# Patient Record
Sex: FEMALE | Race: WHITE | NOT HISPANIC OR LATINO | Employment: UNEMPLOYED | URBAN - METROPOLITAN AREA
[De-identification: names, ages, dates, MRNs, and addresses within clinical notes are randomized per-mention and may not be internally consistent; named-entity substitution may affect disease eponyms.]

---

## 2020-07-22 ENCOUNTER — APPOINTMENT (EMERGENCY)
Dept: RADIOLOGY | Facility: HOSPITAL | Age: 55
DRG: 917 | End: 2020-07-22
Payer: COMMERCIAL

## 2020-07-22 ENCOUNTER — HOSPITAL ENCOUNTER (INPATIENT)
Facility: HOSPITAL | Age: 55
LOS: 5 days | DRG: 917 | End: 2020-07-27
Attending: EMERGENCY MEDICINE | Admitting: INTERNAL MEDICINE
Payer: COMMERCIAL

## 2020-07-22 DIAGNOSIS — R94.31 QT PROLONGATION: ICD-10-CM

## 2020-07-22 DIAGNOSIS — R03.0 ELEVATED BLOOD PRESSURE READING: ICD-10-CM

## 2020-07-22 DIAGNOSIS — T44.3X2A ANTICHOLINERGIC DRUG OVERDOSE, INTENTIONAL SELF-HARM, INITIAL ENCOUNTER (HCC): ICD-10-CM

## 2020-07-22 DIAGNOSIS — F10.10 ETOH ABUSE: ICD-10-CM

## 2020-07-22 DIAGNOSIS — J96.01 ACUTE RESPIRATORY FAILURE WITH HYPOXIA (HCC): ICD-10-CM

## 2020-07-22 DIAGNOSIS — T14.91XA SUICIDE ATTEMPT (HCC): ICD-10-CM

## 2020-07-22 DIAGNOSIS — R33.8 ACUTE URINARY RETENTION: ICD-10-CM

## 2020-07-22 DIAGNOSIS — T50.902A OVERDOSE, INTENTIONAL SELF-HARM, INITIAL ENCOUNTER (HCC): Primary | ICD-10-CM

## 2020-07-22 DIAGNOSIS — J96.91 RESPIRATORY FAILURE WITH HYPOXIA (HCC): ICD-10-CM

## 2020-07-22 DIAGNOSIS — F31.9 BIPOLAR DEPRESSION (HCC): ICD-10-CM

## 2020-07-22 DIAGNOSIS — F10.929 ALCOHOL INTOXICATION (HCC): ICD-10-CM

## 2020-07-22 DIAGNOSIS — F10.929 ALCOHOLIC INTOXICATION WITH COMPLICATION (HCC): ICD-10-CM

## 2020-07-22 LAB
ALBUMIN SERPL BCP-MCNC: 3.9 G/DL (ref 3.5–5)
ALP SERPL-CCNC: 94 U/L (ref 46–116)
ALT SERPL W P-5'-P-CCNC: 39 U/L (ref 12–78)
AMPHETAMINES SERPL QL SCN: NEGATIVE
ANION GAP SERPL CALCULATED.3IONS-SCNC: 12 MMOL/L (ref 4–13)
APAP SERPL-MCNC: <2 UG/ML (ref 10–20)
AST SERPL W P-5'-P-CCNC: 32 U/L (ref 5–45)
BARBITURATES UR QL: NEGATIVE
BASOPHILS # BLD AUTO: 0.04 THOUSANDS/ΜL (ref 0–0.1)
BASOPHILS NFR BLD AUTO: 1 % (ref 0–1)
BENZODIAZ UR QL: NEGATIVE
BILIRUB SERPL-MCNC: 0.2 MG/DL (ref 0.2–1)
BUN SERPL-MCNC: 22 MG/DL (ref 5–25)
CALCIUM SERPL-MCNC: 9 MG/DL (ref 8.3–10.1)
CHLORIDE SERPL-SCNC: 105 MMOL/L (ref 100–108)
CO2 SERPL-SCNC: 24 MMOL/L (ref 21–32)
COCAINE UR QL: NEGATIVE
CREAT SERPL-MCNC: 0.96 MG/DL (ref 0.6–1.3)
EOSINOPHIL # BLD AUTO: 0.37 THOUSAND/ΜL (ref 0–0.61)
EOSINOPHIL NFR BLD AUTO: 4 % (ref 0–6)
ERYTHROCYTE [DISTWIDTH] IN BLOOD BY AUTOMATED COUNT: 14.9 % (ref 11.6–15.1)
ETHANOL SERPL-MCNC: 218 MG/DL (ref 0–3)
GFR SERPL CREATININE-BSD FRML MDRD: 68 ML/MIN/1.73SQ M
GLUCOSE SERPL-MCNC: 83 MG/DL (ref 65–140)
HCT VFR BLD AUTO: 44.6 % (ref 34.8–46.1)
HGB BLD-MCNC: 15.1 G/DL (ref 11.5–15.4)
LYMPHOCYTES # BLD AUTO: 3.54 THOUSANDS/ΜL (ref 0.6–4.47)
LYMPHOCYTES NFR BLD AUTO: 42 % (ref 14–44)
MAGNESIUM SERPL-MCNC: 2.4 MG/DL (ref 1.6–2.6)
MCH RBC QN AUTO: 31 PG (ref 26.8–34.3)
MCHC RBC AUTO-ENTMCNC: 33.9 G/DL (ref 31.4–37.4)
MCV RBC AUTO: 92 FL (ref 82–98)
METHADONE UR QL: NEGATIVE
MONOCYTES # BLD AUTO: 0.71 THOUSAND/ΜL (ref 0.17–1.22)
MONOCYTES NFR BLD AUTO: 8 % (ref 4–12)
NEUTROPHILS # BLD AUTO: 3.79 THOUSANDS/ΜL (ref 1.85–7.62)
NEUTS SEG NFR BLD AUTO: 45 % (ref 43–75)
OPIATES UR QL SCN: NEGATIVE
OXYCODONE+OXYMORPHONE UR QL SCN: NEGATIVE
PCP UR QL: NEGATIVE
PLATELET # BLD AUTO: 268 THOUSANDS/UL (ref 149–390)
PMV BLD AUTO: 9.6 FL (ref 8.9–12.7)
POTASSIUM SERPL-SCNC: 4.3 MMOL/L (ref 3.5–5.3)
PROT SERPL-MCNC: 6.7 G/DL (ref 6.4–8.2)
RBC # BLD AUTO: 4.87 MILLION/UL (ref 3.81–5.12)
SALICYLATES SERPL-MCNC: 3.2 MG/DL (ref 3–20)
SARS-COV-2 RNA RESP QL NAA+PROBE: NEGATIVE
SODIUM SERPL-SCNC: 141 MMOL/L (ref 136–145)
THC UR QL: NEGATIVE
TROPONIN I SERPL-MCNC: <0.02 NG/ML
WBC # BLD AUTO: 8.45 THOUSAND/UL (ref 4.31–10.16)

## 2020-07-22 PROCEDURE — 99291 CRITICAL CARE FIRST HOUR: CPT | Performed by: EMERGENCY MEDICINE

## 2020-07-22 PROCEDURE — 87635 SARS-COV-2 COVID-19 AMP PRB: CPT | Performed by: EMERGENCY MEDICINE

## 2020-07-22 PROCEDURE — 83735 ASSAY OF MAGNESIUM: CPT | Performed by: EMERGENCY MEDICINE

## 2020-07-22 PROCEDURE — 80053 COMPREHEN METABOLIC PANEL: CPT | Performed by: EMERGENCY MEDICINE

## 2020-07-22 PROCEDURE — 94002 VENT MGMT INPAT INIT DAY: CPT

## 2020-07-22 PROCEDURE — 80320 DRUG SCREEN QUANTALCOHOLS: CPT | Performed by: EMERGENCY MEDICINE

## 2020-07-22 PROCEDURE — 94760 N-INVAS EAR/PLS OXIMETRY 1: CPT

## 2020-07-22 PROCEDURE — 0BH18EZ INSERTION OF ENDOTRACHEAL AIRWAY INTO TRACHEA, VIA NATURAL OR ARTIFICIAL OPENING ENDOSCOPIC: ICD-10-PCS | Performed by: EMERGENCY MEDICINE

## 2020-07-22 PROCEDURE — 71045 X-RAY EXAM CHEST 1 VIEW: CPT

## 2020-07-22 PROCEDURE — 80307 DRUG TEST PRSMV CHEM ANLYZR: CPT | Performed by: EMERGENCY MEDICINE

## 2020-07-22 PROCEDURE — 5A1935Z RESPIRATORY VENTILATION, LESS THAN 24 CONSECUTIVE HOURS: ICD-10-PCS | Performed by: EMERGENCY MEDICINE

## 2020-07-22 PROCEDURE — 84484 ASSAY OF TROPONIN QUANT: CPT | Performed by: EMERGENCY MEDICINE

## 2020-07-22 PROCEDURE — 96374 THER/PROPH/DIAG INJ IV PUSH: CPT

## 2020-07-22 PROCEDURE — 80329 ANALGESICS NON-OPIOID 1 OR 2: CPT | Performed by: EMERGENCY MEDICINE

## 2020-07-22 PROCEDURE — 36415 COLL VENOUS BLD VENIPUNCTURE: CPT | Performed by: EMERGENCY MEDICINE

## 2020-07-22 PROCEDURE — 85025 COMPLETE CBC W/AUTO DIFF WBC: CPT | Performed by: EMERGENCY MEDICINE

## 2020-07-22 PROCEDURE — 93005 ELECTROCARDIOGRAM TRACING: CPT

## 2020-07-22 PROCEDURE — 31500 INSERT EMERGENCY AIRWAY: CPT | Performed by: EMERGENCY MEDICINE

## 2020-07-22 PROCEDURE — 99291 CRITICAL CARE FIRST HOUR: CPT

## 2020-07-22 PROCEDURE — 96361 HYDRATE IV INFUSION ADD-ON: CPT

## 2020-07-22 PROCEDURE — 0T9B70Z DRAINAGE OF BLADDER WITH DRAINAGE DEVICE, VIA NATURAL OR ARTIFICIAL OPENING: ICD-10-PCS | Performed by: INTERNAL MEDICINE

## 2020-07-22 RX ORDER — SUMATRIPTAN 100 MG/1
100 TABLET, FILM COATED ORAL ONCE AS NEEDED
COMMUNITY

## 2020-07-22 RX ORDER — LORAZEPAM 2 MG/ML
2 INJECTION INTRAMUSCULAR ONCE
Status: COMPLETED | OUTPATIENT
Start: 2020-07-22 | End: 2020-07-22

## 2020-07-22 RX ORDER — FERROUS SULFATE 325(65) MG
325 TABLET ORAL 2 TIMES DAILY WITH MEALS
COMMUNITY

## 2020-07-22 RX ORDER — ETOMIDATE 2 MG/ML
20 INJECTION INTRAVENOUS ONCE
Status: COMPLETED | OUTPATIENT
Start: 2020-07-22 | End: 2020-07-22

## 2020-07-22 RX ORDER — LORAZEPAM 2 MG/ML
2 INJECTION INTRAMUSCULAR ONCE
Status: COMPLETED | OUTPATIENT
Start: 2020-07-23 | End: 2020-07-23

## 2020-07-22 RX ORDER — SENNA PLUS 8.6 MG/1
1 TABLET ORAL AS NEEDED
COMMUNITY

## 2020-07-22 RX ORDER — PROPOFOL 10 MG/ML
90 INJECTION, EMULSION INTRAVENOUS ONCE
Status: COMPLETED | OUTPATIENT
Start: 2020-07-23 | End: 2020-07-23

## 2020-07-22 RX ORDER — LORAZEPAM 1 MG/1
1 TABLET ORAL AS NEEDED
COMMUNITY
End: 2020-07-31 | Stop reason: HOSPADM

## 2020-07-22 RX ORDER — DULOXETIN HYDROCHLORIDE 60 MG/1
120 CAPSULE, DELAYED RELEASE ORAL DAILY
Status: ON HOLD | COMMUNITY
End: 2020-07-31 | Stop reason: SDUPTHER

## 2020-07-22 RX ORDER — LORAZEPAM 2 MG/ML
INJECTION INTRAMUSCULAR
Status: DISPENSED
Start: 2020-07-22 | End: 2020-07-23

## 2020-07-22 RX ORDER — VECURONIUM BROMIDE 1 MG/ML
10 INJECTION, POWDER, LYOPHILIZED, FOR SOLUTION INTRAVENOUS ONCE
Status: COMPLETED | OUTPATIENT
Start: 2020-07-22 | End: 2020-07-22

## 2020-07-22 RX ORDER — PROPOFOL 10 MG/ML
5-50 INJECTION, EMULSION INTRAVENOUS
Status: DISCONTINUED | OUTPATIENT
Start: 2020-07-23 | End: 2020-07-23

## 2020-07-22 RX ORDER — LAMOTRIGINE 150 MG/1
150 TABLET ORAL DAILY
COMMUNITY
End: 2020-07-31 | Stop reason: HOSPADM

## 2020-07-22 RX ORDER — CETIRIZINE HYDROCHLORIDE 10 MG/1
10 TABLET ORAL DAILY
COMMUNITY

## 2020-07-22 RX ORDER — PROPOFOL 10 MG/ML
INJECTION, EMULSION INTRAVENOUS
Status: COMPLETED
Start: 2020-07-22 | End: 2020-07-23

## 2020-07-22 RX ADMIN — ETOMIDATE 20 MG: 2 INJECTION, SOLUTION INTRAVENOUS at 23:20

## 2020-07-22 RX ADMIN — VECURONIUM BROMIDE 10 MG: 1 INJECTION, POWDER, LYOPHILIZED, FOR SOLUTION INTRAVENOUS at 23:20

## 2020-07-22 RX ADMIN — SODIUM CHLORIDE 1000 ML: 0.9 INJECTION, SOLUTION INTRAVENOUS at 22:15

## 2020-07-22 RX ADMIN — LORAZEPAM 2 MG: 2 INJECTION INTRAMUSCULAR; INTRAVENOUS at 23:20

## 2020-07-23 ENCOUNTER — APPOINTMENT (INPATIENT)
Dept: RADIOLOGY | Facility: HOSPITAL | Age: 55
DRG: 917 | End: 2020-07-23
Payer: COMMERCIAL

## 2020-07-23 PROBLEM — F10.10 ETOH ABUSE: Status: ACTIVE | Noted: 2020-07-23

## 2020-07-23 PROBLEM — F31.9 BIPOLAR DEPRESSION (HCC): Status: ACTIVE | Noted: 2020-07-23

## 2020-07-23 PROBLEM — E66.9 OBESE: Status: ACTIVE | Noted: 2020-07-23

## 2020-07-23 PROBLEM — T14.91XA SUICIDE ATTEMPT (HCC): Status: ACTIVE | Noted: 2020-07-23

## 2020-07-23 PROBLEM — J96.00 RESPIRATORY FAILURE, ACUTE (HCC): Status: ACTIVE | Noted: 2020-07-23

## 2020-07-23 PROBLEM — T44.3X1A ANTICHOLINERGIC DRUG OVERDOSE: Status: ACTIVE | Noted: 2020-07-23

## 2020-07-23 LAB
ANION GAP SERPL CALCULATED.3IONS-SCNC: 12 MMOL/L (ref 4–13)
ARTERIAL PATENCY WRIST A: YES
ATRIAL RATE: 106 BPM
BASE EXCESS BLDA CALC-SCNC: -4 MMOL/L
BASE EXCESS BLDA CALC-SCNC: -8.6 MMOL/L
BASOPHILS # BLD AUTO: 0.06 THOUSANDS/ΜL (ref 0–0.1)
BASOPHILS NFR BLD AUTO: 1 % (ref 0–1)
BODY TEMPERATURE: 99 DEGREES FEHRENHEIT
BODY TEMPERATURE: 99 DEGREES FEHRENHEIT
BUN SERPL-MCNC: 20 MG/DL (ref 5–25)
CALCIUM SERPL-MCNC: 7 MG/DL (ref 8.3–10.1)
CHLORIDE SERPL-SCNC: 106 MMOL/L (ref 100–108)
CO2 SERPL-SCNC: 21 MMOL/L (ref 21–32)
CREAT SERPL-MCNC: 0.7 MG/DL (ref 0.6–1.3)
EOSINOPHIL # BLD AUTO: 0.16 THOUSAND/ΜL (ref 0–0.61)
EOSINOPHIL NFR BLD AUTO: 2 % (ref 0–6)
ERYTHROCYTE [DISTWIDTH] IN BLOOD BY AUTOMATED COUNT: 14.6 % (ref 11.6–15.1)
GFR SERPL CREATININE-BSD FRML MDRD: 99 ML/MIN/1.73SQ M
GLUCOSE SERPL-MCNC: 105 MG/DL (ref 65–140)
HCO3 BLDA-SCNC: 19.2 MMOL/L (ref 22–28)
HCO3 BLDA-SCNC: 20 MMOL/L (ref 22–28)
HCT VFR BLD AUTO: 40.5 % (ref 34.8–46.1)
HGB BLD-MCNC: 13.7 G/DL (ref 11.5–15.4)
HOROWITZ INDEX BLDA+IHG-RTO: 30 MM[HG]
HOROWITZ INDEX BLDA+IHG-RTO: 80 MM[HG]
IMM GRANULOCYTES # BLD AUTO: 0.03 THOUSAND/UL (ref 0–0.2)
IMM GRANULOCYTES NFR BLD AUTO: 0 % (ref 0–2)
LYMPHOCYTES # BLD AUTO: 2.93 THOUSANDS/ΜL (ref 0.6–4.47)
LYMPHOCYTES NFR BLD AUTO: 34 % (ref 14–44)
MAGNESIUM SERPL-MCNC: 1.9 MG/DL (ref 1.6–2.6)
MCH RBC QN AUTO: 31.1 PG (ref 26.8–34.3)
MCHC RBC AUTO-ENTMCNC: 33.8 G/DL (ref 31.4–37.4)
MCV RBC AUTO: 92 FL (ref 82–98)
MONOCYTES # BLD AUTO: 0.65 THOUSAND/ΜL (ref 0.17–1.22)
MONOCYTES NFR BLD AUTO: 7 % (ref 4–12)
NEUTROPHILS # BLD AUTO: 4.9 THOUSANDS/ΜL (ref 1.85–7.62)
NEUTS SEG NFR BLD AUTO: 56 % (ref 43–75)
NRBC BLD AUTO-RTO: 0 /100 WBCS
O2 CT BLDA-SCNC: 18.3 ML/DL (ref 16–23)
O2 CT BLDA-SCNC: 18.9 ML/DL (ref 16–23)
OXYHGB MFR BLDA: 90.3 % (ref 94–97)
OXYHGB MFR BLDA: 94.6 % (ref 94–97)
P AXIS: 44 DEGREES
PCO2 BLDA: 30.3 MM HG (ref 36–44)
PCO2 BLDA: 53.4 MM HG (ref 36–44)
PCO2 TEMP ADJ BLDA: 30.6 MM HG (ref 36–44)
PCO2 TEMP ADJ BLDA: 53.9 MM HG (ref 36–44)
PEEP RESPIRATORY: 5 CM[H2O]
PEEP RESPIRATORY: 8 CM[H2O]
PH BLD: 7.19 [PH] (ref 7.35–7.45)
PH BLD: 7.42 [PH] (ref 7.35–7.45)
PH BLDA: 7.19 [PH] (ref 7.35–7.45)
PH BLDA: 7.42 [PH] (ref 7.35–7.45)
PHOSPHATE SERPL-MCNC: 2.9 MG/DL (ref 2.7–4.5)
PLATELET # BLD AUTO: 225 THOUSANDS/UL (ref 149–390)
PMV BLD AUTO: 9.6 FL (ref 8.9–12.7)
PO2 BLD: 84.2 MM HG (ref 75–129)
PO2 BLD: 84.8 MM HG (ref 75–129)
PO2 BLDA: 83.1 MM HG (ref 75–129)
PO2 BLDA: 83.7 MM HG (ref 75–129)
POTASSIUM SERPL-SCNC: 3.9 MMOL/L (ref 3.5–5.3)
PR INTERVAL: 154 MS
QRS AXIS: 27 DEGREES
QRSD INTERVAL: 142 MS
QT INTERVAL: 384 MS
QTC INTERVAL: 510 MS
RBC # BLD AUTO: 4.4 MILLION/UL (ref 3.81–5.12)
SODIUM SERPL-SCNC: 139 MMOL/L (ref 136–145)
SPECIMEN SOURCE: ABNORMAL
SPECIMEN SOURCE: ABNORMAL
T WAVE AXIS: 111 DEGREES
TROPONIN I SERPL-MCNC: <0.02 NG/ML
VENT AC: 16
VENT AC: 22
VENT- AC: AC
VENT- AC: AC
VENTRICULAR RATE: 106 BPM
VT SETTING VENT: 450 ML
VT SETTING VENT: 450 ML
WBC # BLD AUTO: 8.73 THOUSAND/UL (ref 4.31–10.16)

## 2020-07-23 PROCEDURE — 87081 CULTURE SCREEN ONLY: CPT | Performed by: INTERNAL MEDICINE

## 2020-07-23 PROCEDURE — 84100 ASSAY OF PHOSPHORUS: CPT | Performed by: PHYSICIAN ASSISTANT

## 2020-07-23 PROCEDURE — 94150 VITAL CAPACITY TEST: CPT

## 2020-07-23 PROCEDURE — 93010 ELECTROCARDIOGRAM REPORT: CPT | Performed by: INTERNAL MEDICINE

## 2020-07-23 PROCEDURE — 99292 CRITICAL CARE ADDL 30 MIN: CPT | Performed by: INTERNAL MEDICINE

## 2020-07-23 PROCEDURE — 99291 CRITICAL CARE FIRST HOUR: CPT | Performed by: EMERGENCY MEDICINE

## 2020-07-23 PROCEDURE — 80175 DRUG SCREEN QUAN LAMOTRIGINE: CPT | Performed by: EMERGENCY MEDICINE

## 2020-07-23 PROCEDURE — 82805 BLOOD GASES W/O2 SATURATION: CPT | Performed by: EMERGENCY MEDICINE

## 2020-07-23 PROCEDURE — 99291 CRITICAL CARE FIRST HOUR: CPT | Performed by: PHYSICIAN ASSISTANT

## 2020-07-23 PROCEDURE — 84484 ASSAY OF TROPONIN QUANT: CPT | Performed by: STUDENT IN AN ORGANIZED HEALTH CARE EDUCATION/TRAINING PROGRAM

## 2020-07-23 PROCEDURE — 31500 INSERT EMERGENCY AIRWAY: CPT

## 2020-07-23 PROCEDURE — 94760 N-INVAS EAR/PLS OXIMETRY 1: CPT

## 2020-07-23 PROCEDURE — 80048 BASIC METABOLIC PNL TOTAL CA: CPT | Performed by: PHYSICIAN ASSISTANT

## 2020-07-23 PROCEDURE — 94003 VENT MGMT INPAT SUBQ DAY: CPT

## 2020-07-23 PROCEDURE — 36600 WITHDRAWAL OF ARTERIAL BLOOD: CPT

## 2020-07-23 PROCEDURE — 94762 N-INVAS EAR/PLS OXIMTRY CONT: CPT

## 2020-07-23 PROCEDURE — 83735 ASSAY OF MAGNESIUM: CPT | Performed by: PHYSICIAN ASSISTANT

## 2020-07-23 PROCEDURE — 71045 X-RAY EXAM CHEST 1 VIEW: CPT

## 2020-07-23 PROCEDURE — 82805 BLOOD GASES W/O2 SATURATION: CPT | Performed by: NURSE PRACTITIONER

## 2020-07-23 PROCEDURE — 85025 COMPLETE CBC W/AUTO DIFF WBC: CPT | Performed by: PHYSICIAN ASSISTANT

## 2020-07-23 PROCEDURE — 36415 COLL VENOUS BLD VENIPUNCTURE: CPT | Performed by: EMERGENCY MEDICINE

## 2020-07-23 PROCEDURE — 93005 ELECTROCARDIOGRAM TRACING: CPT

## 2020-07-23 RX ORDER — LORAZEPAM 2 MG/ML
2 INJECTION INTRAMUSCULAR EVERY 2 HOUR PRN
Status: DISCONTINUED | OUTPATIENT
Start: 2020-07-23 | End: 2020-07-23

## 2020-07-23 RX ORDER — FOLIC ACID 1 MG/1
1 TABLET ORAL DAILY
Status: DISCONTINUED | OUTPATIENT
Start: 2020-07-23 | End: 2020-07-27 | Stop reason: HOSPADM

## 2020-07-23 RX ORDER — THIAMINE MONONITRATE (VIT B1) 100 MG
100 TABLET ORAL DAILY
Status: DISCONTINUED | OUTPATIENT
Start: 2020-07-23 | End: 2020-07-27 | Stop reason: HOSPADM

## 2020-07-23 RX ORDER — MAGNESIUM SULFATE HEPTAHYDRATE 40 MG/ML
2 INJECTION, SOLUTION INTRAVENOUS ONCE
Status: COMPLETED | OUTPATIENT
Start: 2020-07-23 | End: 2020-07-23

## 2020-07-23 RX ORDER — CHLORHEXIDINE GLUCONATE 0.12 MG/ML
15 RINSE ORAL EVERY 12 HOURS SCHEDULED
Status: DISCONTINUED | OUTPATIENT
Start: 2020-07-23 | End: 2020-07-24

## 2020-07-23 RX ORDER — SODIUM CHLORIDE, SODIUM GLUCONATE, SODIUM ACETATE, POTASSIUM CHLORIDE, MAGNESIUM CHLORIDE, SODIUM PHOSPHATE, DIBASIC, AND POTASSIUM PHOSPHATE .53; .5; .37; .037; .03; .012; .00082 G/100ML; G/100ML; G/100ML; G/100ML; G/100ML; G/100ML; G/100ML
100 INJECTION, SOLUTION INTRAVENOUS CONTINUOUS
Status: DISCONTINUED | OUTPATIENT
Start: 2020-07-23 | End: 2020-07-23

## 2020-07-23 RX ADMIN — PROPOFOL 50 MCG/KG/MIN: 10 INJECTION, EMULSION INTRAVENOUS at 11:06

## 2020-07-23 RX ADMIN — FOLIC ACID 1 MG: 1 TABLET ORAL at 09:07

## 2020-07-23 RX ADMIN — CHLORHEXIDINE GLUCONATE 0.12% ORAL RINSE 15 ML: 1.2 LIQUID ORAL at 08:02

## 2020-07-23 RX ADMIN — SODIUM CHLORIDE, SODIUM GLUCONATE, SODIUM ACETATE, POTASSIUM CHLORIDE, MAGNESIUM CHLORIDE, SODIUM PHOSPHATE, DIBASIC, AND POTASSIUM PHOSPHATE 100 ML/HR: .53; .5; .37; .037; .03; .012; .00082 INJECTION, SOLUTION INTRAVENOUS at 01:36

## 2020-07-23 RX ADMIN — LORAZEPAM 2 MG: 2 INJECTION INTRAMUSCULAR; INTRAVENOUS at 01:35

## 2020-07-23 RX ADMIN — Medication 1 TABLET: at 09:07

## 2020-07-23 RX ADMIN — CHLORHEXIDINE GLUCONATE 0.12% ORAL RINSE 15 ML: 1.2 LIQUID ORAL at 01:35

## 2020-07-23 RX ADMIN — LORAZEPAM 2 MG: 2 INJECTION INTRAMUSCULAR; INTRAVENOUS at 09:04

## 2020-07-23 RX ADMIN — MAGNESIUM SULFATE HEPTAHYDRATE 2 G: 40 INJECTION, SOLUTION INTRAVENOUS at 10:31

## 2020-07-23 RX ADMIN — LORAZEPAM 2 MG: 2 INJECTION INTRAMUSCULAR; INTRAVENOUS at 11:11

## 2020-07-23 RX ADMIN — Medication 100 MG: at 09:07

## 2020-07-23 RX ADMIN — LORAZEPAM 2 MG: 2 INJECTION INTRAMUSCULAR; INTRAVENOUS at 14:03

## 2020-07-23 RX ADMIN — ENOXAPARIN SODIUM 40 MG: 40 INJECTION SUBCUTANEOUS at 09:06

## 2020-07-23 RX ADMIN — SODIUM CHLORIDE 2.5 MG/HR: 0.9 INJECTION, SOLUTION INTRAVENOUS at 15:14

## 2020-07-23 RX ADMIN — PROPOFOL 10 MCG/KG/MIN: 10 INJECTION, EMULSION INTRAVENOUS at 00:07

## 2020-07-23 RX ADMIN — PROPOFOL 50 MCG/KG/MIN: 10 INJECTION, EMULSION INTRAVENOUS at 03:07

## 2020-07-23 RX ADMIN — PROPOFOL 45 MCG/KG/MIN: 10 INJECTION, EMULSION INTRAVENOUS at 14:00

## 2020-07-23 RX ADMIN — SODIUM CHLORIDE, SODIUM GLUCONATE, SODIUM ACETATE, POTASSIUM CHLORIDE, MAGNESIUM CHLORIDE, SODIUM PHOSPHATE, DIBASIC, AND POTASSIUM PHOSPHATE 100 ML/HR: .53; .5; .37; .037; .03; .012; .00082 INJECTION, SOLUTION INTRAVENOUS at 10:53

## 2020-07-23 RX ADMIN — LAMOTRIGINE 150 MG: 100 TABLET ORAL at 09:07

## 2020-07-23 RX ADMIN — PROPOFOL 50 MCG/KG/MIN: 10 INJECTION, EMULSION INTRAVENOUS at 05:14

## 2020-07-23 RX ADMIN — LORAZEPAM 2 MG: 2 INJECTION INTRAMUSCULAR; INTRAVENOUS at 00:07

## 2020-07-23 RX ADMIN — SODIUM CHLORIDE 1000 ML: 0.9 INJECTION, SOLUTION INTRAVENOUS at 00:16

## 2020-07-23 RX ADMIN — PROPOFOL 90 MG: 10 INJECTION, EMULSION INTRAVENOUS at 00:08

## 2020-07-23 RX ADMIN — PROPOFOL 50 MCG/KG/MIN: 10 INJECTION, EMULSION INTRAVENOUS at 08:01

## 2020-07-23 NOTE — H&P
H&P- Narciso Otto 1965, 49 y o  female MRN: [id number]    Unit/Bed#: ED CT1 Encounter: [id number]    Primary Care Provider: No primary care provider on file  Date and time admitted to hospital: 7/22/2020  9:50 PM        * Anticholinergic drug overdose  Assessment & Plan  Attempted suicide after drinking a bottle of wine and ingesting at least 15 tabs of benadryl 25 mg  Brought in by private vehicle by    Agitated/altered on arrival   UDS negative   Tylenol level <2   Given 2 mg Ativan on arrival and intubated for airway protection shortly after   Continue propofol infusion for sedation    Continue 2mg Ativan prn for seizure activity   Given 2L Bolus in ED, continue Isolyte at 100ml/hr for light hydration, monitor strict I/O's    Respiratory failure, acute hypoxic/hypercapnic ([facility] [address] )  Assessment & Plan  Secondary to sedation medications required for anticholinergic drug overdose  Continue mechanical ventilation    Current settings AC/VC 22/450/80/8    Continue to wean support as tolerated    Keep spo2 >90%    Vent bundle ordered   Sedation:    Continue propofol    Ativan prn    ETOH abuse  Assessment & Plan  Per  patient is intermittently binge drinking, mainly wine but sometimes drinks hard liquor    Recently has been drinking for the last several days   No history of etoh withdrawal   CIWA protocol    Suicide attempt [facility])  Assessment & Plan  H/o suicide attempt approximately 10 years ago with etoh as well as tylenol   Required psych admission after being medically treated  Per  patients father had been putting pressure on family by threatening to withhold financial support unless they live up to expectations   Son at [place] [facility] had a mental break down over this last week causing him to remove himself from the program  This is what sparked the patients recent etoh binge ending with a call to the patients father stating "im going to kill myself", he then sent a text to the  who found her in her room with an empty benadryl container and a bottle of wine  Pill count ingested approximately 15-20 tabs of 25mg benadryl   Etoh 218 on arrival    Bipolar depression [facility])  Assessment & Plan  - Takes cymbalta at home, currently on hold  - Take Lamictal 150mg at home, continue        -------------------------------------------------------------------------------------------------------------  Chief Complaint: AMS    History of Present Illness   HX and PE limited by: Levester Mortimer is a 49 y o  female w/ pmh of bipolar depression and history of suicide attempt  Per  family has been under a lot of stress lately d/t eldest son enrolling at [employer] and having "mental break down" requiring him to leave  In addition to this the patients father had been threatening to withhold financial support unless expectations were met  Over the last three days the patient had started binge drinking wine again until she would pass out everynight  7/22 patient called and threatened father saying that he has caused her too much pain and she would kill herself  The patients father alerted her  who traveled home from visiting their son at [facility], finding her altered with an empty bottle of wine and bendadryl next to her  He called into the ED after EMS was taking to long and brought her in by private vehicle  The patient was given ativan 2mg, 2L IVF bolus, UDS negative, etoh 218 and ultimately required intubation for airway protection  History obtained from chart review and the patients    -------------------------------------------------------------------------------------------------------------  Dispo:  Admit to Critical Care     Code Status: Level 1 - Full Code  --------------------------------------------------------------------------------------------------------------  Review of Systems   Unable to perform ROS: Intubated       Review of systems was unable to be performed secondary to intubated    Physical Exam   Constitutional: She appears well-developed and well-nourished  No distress  HENT:   Head: Normocephalic and atraumatic  Eyes: Pupils are equal, round, and reactive to light  Conjunctivae are normal    Neck: Normal range of motion  Neck supple  Cardiovascular: Normal rate, regular rhythm, normal heart sounds and intact distal pulses  No murmur heard  Pulmonary/Chest: Effort normal and breath sounds normal  No respiratory distress  She has no wheezes  Abdominal: Soft  Bowel sounds are normal  She exhibits no distension  Genitourinary:   Genitourinary Comments: [place]     Musculoskeletal: She exhibits no edema  Neurological:   Sedated/intubated   Skin: Skin is warm and dry  Capillary refill takes less than 2 seconds  She is not diaphoretic  Nursing note and vitals reviewed  --------------------------------------------------------------------------------------------------------------  Vitals:   Vitals:    07/22/20 2345 07/23/20 0000 07/23/20 0015 07/23/20 0047   BP: (!) 240/144 162/85 170/95    Pulse: (!) 108 104 100    Resp: 22 15 15    Temp:       TempSrc:       SpO2: 90% 91% 95% 94%   Weight:         Temp  Min: 99 °F (37 2 °C)  Max: 99 °F (37 2 °C)  IBW: -92 5 kg     There is no height or weight on file to calculate BMI      Laboratory and Diagnostics:  Results from last 7 days   Lab Units 07/22/20  2211   WBC Thousand/uL 8 45   HEMOGLOBIN g/dL 15 1   HEMATOCRIT % 44 6   PLATELETS Thousands/uL 268   NEUTROS PCT % 45   MONOS PCT % 8     Results from last 7 days   Lab Units 07/22/20  2211   SODIUM mmol/L 141   POTASSIUM mmol/L 4 3   CHLORIDE mmol/L 105   CO2 mmol/L 24   ANION GAP mmol/L 12   BUN mg/dL 22   CREATININE mg/dL 0 96   CALCIUM mg/dL 9 0   GLUCOSE RANDOM mg/dL 83   ALT U/L 39   AST U/L 32   ALK PHOS U/L 94   ALBUMIN g/dL 3 9   TOTAL BILIRUBIN mg/dL 0 20     Results from last 7 days   Lab Units 07/22/20  2211   MAGNESIUM mg/dL 2 4           Results from last 7 days   Lab Units 07/22/20  2211   TROPONIN I ng/mL <0 02         ABG:  Results from last 7 days   Lab Units 07/23/20  0027   PH ART  7 191*   PCO2 ART mm Hg 53 4*   PO2 ART mm Hg 83 7   HCO3 ART mmol/L 20 0*   BASE EXC ART mmol/L -8 6   ABG SOURCE  Radial, Left     VBG:  Results from last 7 days   Lab Units 07/23/20  0027   ABG SOURCE  Radial, Left           Micro:        EKG: NSR  Imaging: I have personally reviewed pertinent reports  and I have personally reviewed pertinent films in PACS      Historical Information   Past Medical History:   Diagnosis Date    Depression     Psychiatric disorder      History reviewed  No pertinent surgical history  Social History   Social History     Substance and Sexual Activity   Alcohol Use Yes     Social History     Substance and Sexual Activity   Drug Use Not Currently     Social History     Tobacco Use   Smoking Status Current Every Day Smoker    Packs/day: 1 00   Smokeless Tobacco Never Used     Exercise History: ambulates without issue  Family History:   History reviewed  No pertinent family history  I have reviewed this patient's family history and commented on sigificant items within the HPI      Medications:  Current Facility-Administered Medications   Medication Dose Route Frequency    chlorhexidine (PERIDEX) 0 12 % oral rinse 15 mL  15 mL Swish & Spit Q12H [address]    enoxaparin (LOVENOX) subcutaneous injection 40 mg  40 mg Subcutaneous Daily    lamoTRIgine (LaMICtal) tablet 150 mg  150 mg Oral Daily    multi-electrolyte (PLASMALYTE-A/ISOLYTE-S PH 7 4) IV solution  100 mL/hr Intravenous Continuous    propofol (DIPRIVAN) 1000 mg in 100 mL infusion (premix)  5-50 mcg/kg/min Intravenous Titrated    sodium chloride 0 9 % bolus 1,000 mL  1,000 mL Intravenous Once     Home medications:  Prior to Admission Medications   Prescriptions Last Dose Informant Patient Reported? Taking?    DULoxetine (CYMBALTA) 60 mg delayed release capsule   Yes Yes   Sig: Take 120 mg by mouth daily   Doxylamine Succinate, Sleep, (UNISOM PO)   Yes Yes   Sig: Take by mouth as needed   LORazepam (ATIVAN) 1 mg tablet   Yes Yes   Sig: Take 1 mg by mouth as needed for anxiety   SUMAtriptan (IMITREX) 100 mg tablet   Yes Yes   Sig: Take 100 mg by mouth once as needed for migraine   cetirizine (ZyrTEC) 10 mg tablet   Yes Yes   Sig: Take 10 mg by mouth daily   ferrous sulfate 325 (65 Fe) mg tablet   Yes Yes   Sig: Take 325 mg by mouth 2 (two) times a day with meals   lamoTRIgine (LaMICtal) 150 MG tablet   Yes Yes   Sig: Take 150 mg by mouth daily   senna (SENOKOT) 8 6 MG tablet   Yes Yes   Sig: Take 1 tablet by mouth as needed for constipation      Facility-Administered Medications: None     Allergies: Allergies   Allergen Reactions    Penicillins        ------------------------------------------------------------------------------------------------------------  Advance Directive and Living Will:      Power of :    POLST:    ------------------------------------------------------------------------------------------------------------  Anticipated Length of Stay is > 2 midnights    Care Time Delivered:   Upon my evaluation, this patient had a high probability of imminent or life-threatening deterioration due to respiratory failure, which required my direct attention, intervention, and personal management  I have personally provided 60 minutes (00:00 to 01:00) of critical care time, exclusive of procedures, teaching, family meetings, and any prior time recorded by providers other than myself  Sonia Officer, JONO        Portions of the record may have been created with voice recognition software  Occasional wrong word or "sound a like" substitutions may have occurred due to the inherent limitations of voice recognition software    Read the chart carefully and recognize, using context, where substitutions have occurred

## 2020-07-23 NOTE — UTILIZATION REVIEW
Continued Stay Review    Date:   7/23/20                        Current Patient Class: inpatient    Current Level of Care: icu    Assessment/Plan: icu mechanical ventilation consult toxicology   etoh  Cont  Propofol ativan pr   htn start Nicardipine gtt  Suicide attempt will have 1:1 once extubated   Psych consult once extubated  Toxicology  History of encephalopathy, tachycardia, garbled speech, large urinary output on post insertion, and report of diphenhhydramine overdose are consistent with anticholinergic toxicity  Her QRS and QTc prolongation may occur with diphenhydramine, duloxetine or lamotrigine overdose as per known availability  Please continue supportive care, PRN benzodiazepines/propofol, electrolyte optimization and hydration  Anticholinergic toxidrome generally lasts 12 to 36 hours  Since her QRS has normalized, bicarbonate administration is not indicated  QTc has also normalized  Elevated blood pressure is also common with anticholinergic overdose, but her blood pressure readings seem more profound, especially without history of hypertension  Therefore, I would recommend CT imaging of brain, as well as neck (secondary to being found on ground), and blood pressure control  This markedly elevated blood pressure also raises concern for possible coingestion of her sumatriptan, or possibly her duloxetine  In the case of sumatriptan or other ergot alkaloid overdose, antihypertensives are indicated  Agree with nicardipine infusion  Nitroglycerin and phentolamine are additional considerations    Please monitor closely for any sign of hypertension-related end organ injury, including repeating a troponin and monitoring for digital ischemia       Pertinent Labs/Diagnostic Results:   Results from last 7 days   Lab Units 07/22/20  2235   SARS-COV-2  Negative     Results from last 7 days   Lab Units 07/23/20  0435 07/22/20  2211   WBC Thousand/uL 8 73 8 45   HEMOGLOBIN g/dL 13 7 15 1   HEMATOCRIT % 40 5 44 6   PLATELETS Thousands/uL 225 268   NEUTROS ABS Thousands/µL 4 90 3 79     Results from last 7 days   Lab Units 07/23/20  0435 07/22/20  2211   SODIUM mmol/L 139 141   POTASSIUM mmol/L 3 9 4 3   CHLORIDE mmol/L 106 105   CO2 mmol/L 21 24   ANION GAP mmol/L 12 12   BUN mg/dL 20 22   CREATININE mg/dL 0 70 0 96   EGFR ml/min/1 73sq m 99 68   CALCIUM mg/dL 7 0* 9 0   MAGNESIUM mg/dL 1 9 2 4   PHOSPHORUS mg/dL 2 9  --      Results from last 7 days   Lab Units 07/22/20  2211   AST U/L 32   ALT U/L 39   ALK PHOS U/L 94   TOTAL PROTEIN g/dL 6 7   ALBUMIN g/dL 3 9   TOTAL BILIRUBIN mg/dL 0 20     Results from last 7 days   Lab Units 07/23/20  0728 07/23/20  0027   PH ART  7 420 7 191*   PCO2 ART mm Hg 30 3* 53 4*   PO2 ART mm Hg 83 1 83 7   HCO3 ART mmol/L 19 2* 20 0*   BASE EXC ART mmol/L -4 0 -8 6   O2 CONTENT ART mL/dL 18 9 18 3   O2 HGB, ARTERIAL % 94 6 90 3*   ABG SOURCE  Radial, Left Radial, Left     Results from last 7 days   Lab Units 07/22/20  2211   TROPONIN I ng/mL <0 02     Results from last 7 days   Lab Units 07/22/20  2230   AMPH/METH  Negative   BARBITURATE UR  Negative   BENZODIAZEPINE UR  Negative   COCAINE UR  Negative   METHADONE URINE  Negative   OPIATE UR  Negative   PCP UR  Negative   THC UR  Negative     Results from last 7 days   Lab Units 07/22/20  2211   ETHANOL LVL mg/dL 218*   ACETAMINOPHEN LVL ug/mL <0 2*   SALICYLATE LVL mg/dL 3 2       Vital Signs:   07/23/20 1600  97 6 °F (36 4 °C)  100  23Abnormal   181/84Abnormal   120  98 %       07/23/20 1554            98 %       07/23/20 1545    89  25Abnormal   174/77Abnormal   119  99 %       07/23/20 1530    90  24Abnormal   189/81Abnormal   117  98 %       07/23/20 1500    86  25Abnormal   194/88Abnormal    126  98 %       BP: nicardipine gtt initiated at 07/23/20 1500   07/23/20 1400    88  37Abnormal   177/90Abnormal   126  98 %       07/23/20 1330    85  26Abnormal   168/89   122  96 %       BP: Nicardipine gtt ordered, on hold for now, Dr Delfina Chowdary aware at 07/23/20 1330       icu  Mechanical ventilation  Npo  mrsa cx  Neuro checks  Medications:   Scheduled Medications:    Medications:  chlorhexidine 15 mL Swish & Spit Q12H [address]   enoxaparin 40 mg Subcutaneous Daily   folic acid 1 mg Oral Daily   lamoTRIgine 150 mg Oral Daily   multivitamin-minerals 1 tablet Oral Daily   thiamine 100 mg Oral Daily     Continuous IV Infusions:    multi-electrolyte 100 mL/hr Intravenous Continuous   propofol 5-50 mcg/kg/min Intravenous Titrated     PRN Meds:    LORazepam 2 mg Intravenous Q2H PRN   [START ON 7/24/2020] pneumococcal 23-valent polysaccharide vaccine 0 5 mL Intramuscular Prior to discharge       Discharge Plan: tbd    Network Utilization Review Department  [email or web address]  ATTENTION: Please call with any questions or concerns to [phone] and carefully listen to the prompts so that you are directed to the right person  All voicemails are confidential   Mati Ann all requests for admission clinical reviews, approved or denied determinations and any other requests to dedicated fax number below belonging to the campus where the patient is receiving treatment   List of dedicated fax numbers for the Facilities:  [address] [place] [address] DENIALS (Administrative/Medical Necessity) [phone]   [address] (Maternity/NICU/Pediatrics) [phone]   Gabriela Mchugh [phone]   Eleni Sinclair [phone]   Matthew De Leon [phone]   [address] [facility]  [phone]   [address] [facility] [address] [facility] [phone]   [facility]  [phone]   [address] [facility], [facility]  [address] [address] [phone]

## 2020-07-23 NOTE — ED NOTES
Pt extremely lethargic, obtunded, speech garbled  Almost unintelligible, hard to keep awake,  with pt   States hx of overdose in past as well     Stuart Verde RN  07/22/20 0324

## 2020-07-23 NOTE — ED NOTES
Pt desaturating again  Unable to maintain a good O2 sat even with a non rebreather   Doctor aware and preparing for intubation     Betsy Harris RN  07/23/20 1900

## 2020-07-23 NOTE — RESPIRATORY THERAPY NOTE
Extubated to 2L NC with CED Berger at bedside   Clear breath sounds, no stridor, able to vocalize, tolerated well

## 2020-07-23 NOTE — ED NOTES
Pt desaturated down to 88%, pt is lethargic   Oxygen 3 liters NC applied and O2 sat up to 95%     Chino Omalley RN  07/22/20 9793

## 2020-07-23 NOTE — PLAN OF CARE
Problem: Potential for Falls  Goal: Patient will remain free of falls  Description  INTERVENTIONS:  - Assess patient frequently for physical needs  -  Identify cognitive and physical deficits and behaviors that affect risk of falls  -  [place] fall precautions as indicated by assessment   - Educate patient/family on patient safety including physical limitations  - Instruct patient to call for assistance with activity based on assessment  - Modify environment to reduce risk of injury  - Consider OT/PT consult to assist with strengthening/mobility  7/23/2020 0234 by Inez Grace RN  Outcome: Progressing  7/23/2020 0234 by Inez Grace RN  Outcome: Progressing     Problem: RESPIRATORY - ADULT  Goal: Achieves optimal ventilation and oxygenation  Description  INTERVENTIONS:  - Assess for changes in respiratory status  - Assess for changes in mentation and behavior  - Position to facilitate oxygenation and minimize respiratory effort  - Oxygen administered by appropriate delivery if ordered  - Initiate smoking cessation education as indicated  - Encourage broncho-pulmonary hygiene including cough, deep breathe, Incentive Spirometry  - Assess the need for suctioning and aspirate as needed  - Assess and instruct to report SOB or any respiratory difficulty  - Respiratory Therapy support as indicated  7/23/2020 0234 by Inez Grace RN  Outcome: Progressing  7/23/2020 0234 by Inez Grace RN  Outcome: Progressing     Problem: Nutrition/Hydration-ADULT  Goal: Nutrient/Hydration intake appropriate for improving, restoring or maintaining nutritional needs  Description  Monitor and assess patient's nutrition/hydration status for malnutrition  Collaborate with interdisciplinary team and initiate plan and interventions as ordered  Monitor patient's weight and dietary intake as ordered or per policy  Utilize nutrition screening tool and intervene as necessary   Determine patient's food preferences and provide high-protein, high-caloric foods as appropriate       INTERVENTIONS:  - Monitor oral intake, urinary output, labs, and treatment plans  - Assess nutrition and hydration status and recommend course of action  - Evaluate amount of meals eaten  - Assist patient with eating if necessary   - Allow adequate time for meals  - Recommend/ encourage appropriate diets, oral nutritional supplements, and vitamin/mineral supplements  - Order, calculate, and assess calorie counts as needed  - Recommend, monitor, and adjust tube feedings and TPN/PPN based on assessed needs  - Assess need for intravenous fluids  - Provide specific nutrition/hydration education as appropriate  - Include patient/family/caregiver in decisions related to nutrition  7/23/2020 0234 by America Rosado RN  Outcome: Progressing  7/23/2020 0234 by America Rosado RN  Outcome: Progressing     Problem: Prexisting or High Potential for Compromised Skin Integrity  Goal: Skin integrity is maintained or improved  Description  INTERVENTIONS:  - Identify patients at risk for skin breakdown  - Assess and monitor skin integrity  - Assess and monitor nutrition and hydration status  - Monitor labs   - Assess for incontinence   - Turn and reposition patient  - Assist with mobility/ambulation  - Relieve pressure over bony prominences  - Avoid friction and shearing  - Provide appropriate hygiene as needed including keeping skin clean and dry  - Evaluate need for skin moisturizer/barrier cream  - Collaborate with interdisciplinary team   - Patient/family teaching  - Consider wound care consult   7/23/2020 0234 by America Rosado RN  Outcome: Progressing  7/23/2020 0234 by America Rosado RN  Outcome: Progressing     Problem: SAFETY,RESTRAINT: NV/NON-SELF DESTRUCTIVE BEHAVIOR  Goal: Remains free of harm/injury (restraint for non violent/non self-detsructive behavior)  Description  INTERVENTIONS:  - Instruct patient/family regarding restraint use   - Assess and monitor physiologic and psychological status   - Provide interventions and comfort measures to meet assessed patient needs   - Identify and implement measures to help patient regain control  - Assess readiness for release of restraint   7/23/2020 0234 by Jarrett Short RN  Outcome: Progressing  7/23/2020 0234 by Jarrett Short RN  Outcome: Progressing  Goal: Returns to optimal restraint-free functioning  Description  INTERVENTIONS:  - Assess the patient's behavior and symptoms that indicate continued need for restraint  - Identify and implement measures to help patient regain control  - Assess readiness for release of restraint   7/23/2020 0234 by Jarrett Short RN  Outcome: Progressing  7/23/2020 0234 by Jarrett Short RN  Outcome: Progressing     Problem: SAFETY ADULT  Goal: Patient will remain free of falls  Description  INTERVENTIONS:  - Assess patient frequently for physical needs  -  Identify cognitive and physical deficits and behaviors that affect risk of falls    -  [place] fall precautions as indicated by assessment   - Educate patient/family on patient safety including physical limitations  - Instruct patient to call for assistance with activity based on assessment  - Modify environment to reduce risk of injury  - Consider OT/PT consult to assist with strengthening/mobility  7/23/2020 0234 by Jarrett Short RN  Outcome: Progressing  7/23/2020 0234 by Jarrett Short RN  Outcome: Progressing  Goal: Maintain or return to baseline ADL function  Description  INTERVENTIONS:  -  Assess patient's ability to carry out ADLs; assess patient's baseline for ADL function and identify physical deficits which impact ability to perform ADLs (bathing, care of mouth/teeth, toileting, grooming, dressing, etc )  - Assess/evaluate cause of self-care deficits   - Assess range of motion  - Assess patient's mobility; develop plan if impaired  - Assess patient's need for assistive devices and provide as appropriate  - Encourage maximum independence but intervene and supervise when necessary  - Involve family in performance of ADLs  - Assess for home care needs following discharge   - Consider OT consult to assist with ADL evaluation and planning for discharge  - Provide patient education as appropriate  7/23/2020 0234 by Phuong Sifuentes RN  Outcome: Progressing  7/23/2020 0234 by Phuong Sifuentes RN  Outcome: Progressing  Goal: Maintain or return mobility status to optimal level  Description  INTERVENTIONS:  - Assess patient's baseline mobility status (ambulation, transfers, stairs, etc )    - Identify cognitive and physical deficits and behaviors that affect mobility  - Identify mobility aids required to assist with transfers and/or ambulation (gait belt, sit-to-stand, lift, walker, cane, etc )  - [place] fall precautions as indicated by assessment  - Record patient progress and toleration of activity level on Mobility SBAR; progress patient to next Phase/Stage  - Instruct patient to call for assistance with activity based on assessment  - Consider rehabilitation consult to assist with strengthening/weightbearing, etc   7/23/2020 0234 by Phuong Sifuentes RN  Outcome: Progressing  7/23/2020 0234 by Phuong Sifuentes RN  Outcome: Progressing     Problem: DISCHARGE PLANNING  Goal: Discharge to home or other facility with appropriate resources  Description  INTERVENTIONS:  - Identify barriers to discharge w/patient and caregiver  - Arrange for needed discharge resources and transportation as appropriate  - Identify discharge learning needs (meds, wound care, etc )  - Arrange for interpretive services to assist at discharge as needed  - Refer to Case Management Department for coordinating discharge planning if the patient needs post-hospital services based on physician/advanced practitioner order or complex needs related to functional status, cognitive ability, or social support system  7/23/2020 0234 by Phuong Sifuentes RN  Outcome: Progressing  7/23/2020 0234 by Inez Grace RN  Outcome: Progressing     Problem: Knowledge Deficit  Goal: Patient/family/caregiver demonstrates understanding of disease process, treatment plan, medications, and discharge instructions  Description  Complete learning assessment and assess knowledge base    Interventions:  - Provide teaching at level of understanding  - Provide teaching via preferred learning methods  7/23/2020 0234 by Inez Grace RN  Outcome: Progressing  7/23/2020 0234 by Inez Grace RN  Outcome: Progressing     Problem: CARDIOVASCULAR - ADULT  Goal: Maintains optimal cardiac output and hemodynamic stability  Description  INTERVENTIONS:  - Monitor I/O, vital signs and rhythm  - Monitor for S/S and trends of decreased cardiac output  - Administer and titrate ordered vasoactive medications to optimize hemodynamic stability  - Assess quality of pulses, skin color and temperature  - Assess for signs of decreased coronary artery perfusion  - Instruct patient to report change in severity of symptoms  7/23/2020 0234 by Inez Grace RN  Outcome: Progressing  7/23/2020 0234 by Inez Grace RN  Outcome: Progressing  Goal: Absence of cardiac dysrhythmias or at baseline rhythm  Description  INTERVENTIONS:  - Continuous cardiac monitoring, vital signs, obtain 12 lead EKG if ordered  - Administer antiarrhythmic and heart rate control medications as ordered  - Monitor electrolytes and administer replacement therapy as ordered  7/23/2020 0234 by Inez Grace RN  Outcome: Progressing  7/23/2020 0234 by Inez Grace RN  Outcome: Progressing     Problem: GENITOURINARY - ADULT  Goal: Maintains or returns to baseline urinary function  Description  INTERVENTIONS:  - Assess urinary function  - Encourage oral fluids to ensure adequate hydration if ordered  - Administer IV fluids as ordered to ensure adequate hydration  - Administer ordered medications as needed  - Offer frequent toileting  - Follow urinary retention protocol if ordered  7/23/2020 0234 by Linda Montes RN  Outcome: Progressing  7/23/2020 0234 by Linda Montes RN  Outcome: Progressing  Goal: Urinary catheter remains patent  Description  INTERVENTIONS:  - Assess patency of urinary catheter  - If patient has a chronic post, consider changing catheter if non-functioning  - Follow guidelines for intermittent irrigation of non-functioning urinary catheter  7/23/2020 0234 by Linda Montes RN  Outcome: Progressing  7/23/2020 0234 by Linda Montes RN  Outcome: Progressing     Problem: METABOLIC, FLUID AND ELECTROLYTES - ADULT  Goal: Electrolytes maintained within normal limits  Description  INTERVENTIONS:  - Monitor labs and assess patient for signs and symptoms of electrolyte imbalances  - Administer electrolyte replacement as ordered  - Monitor response to electrolyte replacements, including repeat lab results as appropriate  - Instruct patient on fluid and nutrition as appropriate  7/23/2020 0234 by Linda Montes RN  Outcome: Progressing  7/23/2020 0234 by Linda Montes RN  Outcome: Progressing  Goal: Fluid balance maintained  Description  INTERVENTIONS:  - Monitor labs   - Monitor I/O and WT  - Instruct patient on fluid and nutrition as appropriate  - Assess for signs & symptoms of volume excess or deficit  7/23/2020 0234 by Linda Montes RN  Outcome: Progressing  7/23/2020 0234 by Linda Montes RN  Outcome: Progressing  Goal: Glucose maintained within target range  Description  INTERVENTIONS:  - Monitor Blood Glucose as ordered  - Assess for signs and symptoms of hyperglycemia and hypoglycemia  - Administer ordered medications to maintain glucose within target range  - Assess nutritional intake and initiate nutrition service referral as needed  7/23/2020 0234 by Linda Montes RN  Outcome: Progressing  7/23/2020 0234 by Linda Montes RN  Outcome: Progressing     Problem: SKIN/TISSUE INTEGRITY - ADULT  Goal: Skin integrity remains intact  Description  INTERVENTIONS  - Identify patients at risk for skin breakdown  - Assess and monitor skin integrity  - Assess and monitor nutrition and hydration status  - Monitor labs (i e  albumin)  - Assess for incontinence   - Turn and reposition patient  - Assist with mobility/ambulation  - Relieve pressure over bony prominences  - Avoid friction and shearing  - Provide appropriate hygiene as needed including keeping skin clean and dry  - Evaluate need for skin moisturizer/barrier cream  - Collaborate with interdisciplinary team (i e  Nutrition, Rehabilitation, etc )   - Patient/family teaching  7/23/2020 0234 by Marylou Hong RN  Outcome: Progressing  7/23/2020 0234 by Marylou Hong RN  Outcome: Progressing     Problem: MUSCULOSKELETAL - ADULT  Goal: Maintain or return mobility to safest level of function  Description  INTERVENTIONS:  - Assess patient's ability to carry out ADLs; assess patient's baseline for ADL function and identify physical deficits which impact ability to perform ADLs (bathing, care of mouth/teeth, toileting, grooming, dressing, etc )  - Assess/evaluate cause of self-care deficits   - Assess range of motion  - Assess patient's mobility  - Assess patient's need for assistive devices and provide as appropriate  - Encourage maximum independence but intervene and supervise when necessary  - Involve family in performance of ADLs  - Assess for home care needs following discharge   - Consider OT consult to assist with ADL evaluation and planning for discharge  - Provide patient education as appropriate  7/23/2020 0234 by Marylou Hong RN  Outcome: Progressing  7/23/2020 0234 by Marylou Hong RN  Outcome: Progressing  Goal: Maintain proper alignment of affected body part  Description  INTERVENTIONS:  - Support, maintain and protect limb and body alignment  - Provide patient/ family with appropriate education  7/23/2020 0234 by Marylou Hong RN  Outcome: Progressing  7/23/2020 0234 by Desiree Ya, RN  Outcome: Progressing

## 2020-07-23 NOTE — ED NOTES
Pt BP increasing and pt moving arms and legs  Diprivan being titrated to sedation         Semaj James RN  07/23/20 [id number]

## 2020-07-23 NOTE — ED NOTES
Pt   at bedside, repeatedly asking about wife why she is twitching, if she is going walk out of here  Have ad multiple conversations  Explaining all the care and what we are doing to monitor the patient  He has had to be repeatedly told  To stay in the room and place mask back on face   again asked what we are going to do   For her explained she may be  Intubated   If her breathing gets worse or she becomes more lethargic  Will continue to monitor and update her  as needed  He continues to pace in the room     Herminia Byrd, [address] [facility]  07/22/20 225 [facility] [address], RN  07/22/20 4070

## 2020-07-23 NOTE — ED PROVIDER NOTES
History  Chief Complaint   Patient presents with    Overdose - Intentional     told  she texted father and told him she was going to kill herself  having prob with son  took normal bedtime meds  has been drinking wine all day and told  she took about 18 benadryl     Pt in ER with her  after an intentional medication overdose  Per , she said that she took 18tabs of benadryl at approx 8p  Pt also has a hx of chronic alcohol abuse, and has been drinking all day today  Pt lethargic with slurred speech on initial eval, oriented x 2  She denies somatic complaints  Per , pt with a hx of a previous suicide attempt 10-15 yrs ago, at which time she overdosed on tylenol  History provided by:  Patient  History limited by:  Mental status change   used: No    Overdose - Intentional   Ingested substance:  Alcohol and prescription medication  Witnesses present: no    Called poison control: no    Incident location:  Home  Context: depression and suicide attempt    Risk factors: hx of suicide attempts        Prior to Admission Medications   Prescriptions Last Dose Informant Patient Reported? Taking?    DULoxetine (CYMBALTA) 60 mg delayed release capsule Unknown at Unknown time  Yes No   Sig: Take 120 mg by mouth daily   Doxylamine Succinate, Sleep, (UNISOM PO) Unknown at Unknown time  Yes No   Sig: Take by mouth as needed   LORazepam (ATIVAN) 1 mg tablet Unknown at Unknown time  Yes No   Sig: Take 1 mg by mouth as needed for anxiety   SUMAtriptan (IMITREX) 100 mg tablet Unknown at Unknown time  Yes No   Sig: Take 100 mg by mouth once as needed for migraine   cetirizine (ZyrTEC) 10 mg tablet Unknown at Unknown time  Yes No   Sig: Take 10 mg by mouth daily   ferrous sulfate 325 (65 Fe) mg tablet Unknown at Unknown time  Yes No   Sig: Take 325 mg by mouth 2 (two) times a day with meals   lamoTRIgine (LaMICtal) 150 MG tablet Unknown at Unknown time  Yes No   Sig: Take 150 mg by mouth daily   senna (SENOKOT) 8 6 MG tablet Unknown at Unknown time  Yes No   Sig: Take 1 tablet by mouth as needed for constipation      Facility-Administered Medications: None       Past Medical History:   Diagnosis Date    Depression     Psychiatric disorder        History reviewed  No pertinent surgical history  History reviewed  No pertinent family history  I have reviewed and agree with the history as documented  E-Cigarette/Vaping    E-Cigarette Use Current Every Day User      E-Cigarette/Vaping Substances     Social History     Tobacco Use    Smoking status: Current Every Day Smoker     Packs/day: 1 00    Smokeless tobacco: Never Used   Substance Use Topics    Alcohol use: Yes    Drug use: Not Currently       Review of Systems   Unable to perform ROS: Mental status change       Physical Exam  Physical Exam   Constitutional: She appears well-developed and well-nourished  HENT:   Head: Normocephalic and atraumatic  Eyes: Pupils are equal, round, and reactive to light  Conjunctivae and EOM are normal  Right eye exhibits no discharge  Left eye exhibits no discharge  Pupils 2mm and reactive on initial eval   Cardiovascular: Normal rate and regular rhythm  Pulmonary/Chest: Effort normal and breath sounds normal    Abdominal: Soft  Bowel sounds are normal    Neurological: GCS eye subscore is 3  GCS verbal subscore is 4  GCS motor subscore is 6  Skin: Skin is warm and dry  Psychiatric: Her speech is slurred  She is slowed  She expresses impulsivity and inappropriate judgment  She expresses suicidal ideation  Nursing note and vitals reviewed        Vital Signs  ED Triage Vitals   Temperature Pulse Respirations Blood Pressure SpO2   07/22/20 2218 07/22/20 2200 07/22/20 2200 07/22/20 2200 07/22/20 2200   99 °F (37 2 °C) (!) 106 (!) 25 (!) 185/90 98 %      Temp Source Heart Rate Source Patient Position - Orthostatic VS BP Location FiO2 (%)   07/22/20 2218 07/23/20 0130 07/23/20 0130 07/23/20 0130 --   Tympanic Monitor Lying Left arm       Pain Score       --                  Vitals:    07/23/20 0300 07/23/20 0400 07/23/20 0500 07/23/20 0600   BP: (!) 149/101 144/93 (!) 157/101 (!) 184/114   Pulse: 86 85 80 80   Patient Position - Orthostatic VS:             Visual Acuity  Visual Acuity      Most Recent Value   L Pupil Size (mm)  2   R Pupil Size (mm)  2   L Pupil Shape  Round   R Pupil Shape  Round          ED Medications  Medications   propofol (DIPRIVAN) 1000 mg in 100 mL infusion (premix) (50 mcg/kg/min × 98 9 kg Intravenous New Bag 7/23/20 0514)   chlorhexidine (PERIDEX) 0 12 % oral rinse 15 mL (15 mL Swish & Spit Given 7/23/20 0135)   enoxaparin (LOVENOX) subcutaneous injection 40 mg (has no administration in time range)   multi-electrolyte (PLASMALYTE-A/ISOLYTE-S PH 7 4) IV solution (100 mL/hr Intravenous New Bag 7/23/20 0136)   lamoTRIgine (LaMICtal) tablet 150 mg (has no administration in time range)   LORazepam (ATIVAN) injection 2 mg (2 mg Intravenous Given 7/23/20 0135)   thiamine (VITAMIN B1) tablet 100 mg (has no administration in time range)   folic acid (FOLVITE) tablet 1 mg (has no administration in time range)   multivitamin-minerals (CENTRUM) tablet 1 tablet (has no administration in time range)   sodium chloride 0 9 % bolus 1,000 mL (0 mL Intravenous Stopped 7/22/20 2311)   LORazepam (ATIVAN) injection 2 mg (2 mg Intravenous Given 7/22/20 2320)   etomidate (AMIDATE) 2 mg/mL injection 20 mg (20 mg Intravenous Given 7/22/20 2320)   vecuronium (NORCURON) injection 10 mg (10 mg Intravenous Given 7/22/20 2320)   LORazepam (ATIVAN) injection 2 mg (2 mg Intravenous Given 7/23/20 0007)   propofol (DIPRIVAN) 200 MG/20ML bolus injection 90 mg (90 mg Intravenous Given 7/23/20 0008)   sodium chloride 0 9 % bolus 1,000 mL (0 mL Intravenous Stopped 7/23/20 0123)       Diagnostic Studies  Results Reviewed     Procedure Component Value Units Date/Time    Basic metabolic panel [[id number]]  (Abnormal) Collected:  07/23/20 0435    Lab Status:  Final result Specimen:  Blood from Arm, Left Updated:  07/23/20 0507     Sodium 139 mmol/L      Potassium 3 9 mmol/L      Chloride 106 mmol/L      CO2 21 mmol/L      ANION GAP 12 mmol/L      BUN 20 mg/dL      Creatinine 0 70 mg/dL      Glucose 105 mg/dL      Calcium 7 0 mg/dL      eGFR 99 ml/min/1 73sq m     Narrative:       [employer] guidelines for Chronic Kidney Disease (CKD):     Stage 1 with normal or high GFR (GFR > 90 mL/min/1 73 square meters)    Stage 2 Mild CKD (GFR = 60-89 mL/min/1 73 square meters)    Stage 3A Moderate CKD (GFR = 45-59 mL/min/1 73 square meters)    Stage 3B Moderate CKD (GFR = 30-44 mL/min/1 73 square meters)    Stage 4 Severe CKD (GFR = 15-29 mL/min/1 73 square meters)    Stage 5 End Stage CKD (GFR <15 mL/min/1 73 square meters)  Note: GFR calculation is accurate only with a steady state creatinine    Magnesium [[id number]]  (Normal) Collected:  07/23/20 0435    Lab Status:  Final result Specimen:  Blood from Arm, Left Updated:  07/23/20 0507     Magnesium 1 9 mg/dL     Phosphorus [[id number]]  (Normal) Collected:  07/23/20 0435    Lab Status:  Final result Specimen:  Blood from Arm, Left Updated:  07/23/20 0507     Phosphorus 2 9 mg/dL     CBC and differential [[id number]] Collected:  07/23/20 0435    Lab Status:  Final result Specimen:  Blood from Arm, Left Updated:  07/23/20 0440     WBC 8 73 Thousand/uL      RBC 4 40 Million/uL      Hemoglobin 13 7 g/dL      Hematocrit 40 5 %      MCV 92 fL      MCH 31 1 pg      MCHC 33 8 g/dL      RDW 14 6 %      MPV 9 6 fL      Platelets 477 Thousands/uL      nRBC 0 /100 WBCs      Neutrophils Relative 56 %      Immat GRANS % 0 %      Lymphocytes Relative 34 %      Monocytes Relative 7 %      Eosinophils Relative 2 %      Basophils Relative 1 %      Neutrophils Absolute 4 90 Thousands/µL      Immature Grans Absolute 0 03 Thousand/uL      Lymphocytes Absolute 2 93 Thousands/µL Monocytes Absolute 0 65 Thousand/µL      Eosinophils Absolute 0 16 Thousand/µL      Basophils Absolute 0 06 Thousands/µL     Blood gas, arterial [[id number]]  (Abnormal) Collected:  07/23/20 0027    Lab Status:  Final result Specimen:  Blood, Arterial from Radial, Left Updated:  07/23/20 0040     pH, Arterial 7  [address] TC 7 188     pCO2, Arterial 53 4 mm Hg      PCO2 (TC) Arterial 53 9 mm Hg      pO2, Arterial 83 7 mm Hg      PO2 (TC) Arterial 84 8 mm Hg      HCO3, Arterial 20 0 mmol/L      Base Excess, Arterial -8 6 mmol/L      O2 Content, Arterial 18 3 mL/dL      O2 HGB,Arterial  90 3 %      SOURCE Radial, Left     ARACELY TEST Yes     Temperature 99 0 Degrees Fehrenheit      Vent Type- AC AC     AC Rate 16     Tidal Volume 450 ml      Inspired Air (FIO2) 80     PEEP 8    Lamotrigine level [[id number]] Collected:  07/23/20 0021    Lab Status: In process Specimen:  Blood from Arm, Right Updated:  07/23/20 0024    Novel Coronavirus (Covid-19),PCR [facility] [[id number]]  (Normal) Collected:  07/22/20 2235    Lab Status:  Final result Specimen:  Nares from Nose Updated:  07/22/20 2332     SARS-CoV-2 Negative    Narrative: The specimen collection materials, transport medium, and/or testing methodology utilized in the production of these test results have been proven to be reliable in a limited validation with an abbreviated program under the Emergency Utilization Authorization provided by the FDA  Testing reported as "Presumptive positive" will be confirmed with secondary testing with a reference laboratory to ensure result accuracy  Clinical caution and judgement should be used with the interpretation of these results with consideration of the clinical impression and other laboratory testing  Testing reported as "Positive" or "Negative" has been proven to be accurate according to standard laboratory validation requirements    All testing is performed with control materials showing appropriate reactivity at standard intervals  Rapid drug screen, urine [[id number]]  (Normal) Collected:  07/22/20 2230    Lab Status:  Final result Specimen:  Urine, Clean Catch Updated:  07/22/20 2250     Amph/Meth UR Negative     Barbiturate Ur Negative     Benzodiazepine Urine Negative     Cocaine Urine Negative     Methadone Urine Negative     Opiate Urine Negative     PCP Ur Negative     THC Urine Negative     Oxycodone Urine Negative    Narrative:       FOR MEDICAL PURPOSES ONLY  IF CONFIRMATION NEEDED PLEASE CONTACT THE LAB WITHIN 5 DAYS  Drug Screen Cutoff Levels:  AMPHETAMINE/METHAMPHETAMINES  1000 ng/mL  BARBITURATES     200 ng/mL  BENZODIAZEPINES     200 ng/mL  COCAINE      300 ng/mL  METHADONE      300 ng/mL  OPIATES      300 ng/mL  PHENCYCLIDINE     25 ng/mL  THC       50 ng/mL  OXYCODONE      100 ng/mL    Troponin I [[id number]]  (Normal) Collected:  07/22/20 2211    Lab Status:  Final result Specimen:  Blood from Arm, Right Updated:  07/22/20 2240     Troponin I <0 02 ng/mL     Acetaminophen level-If concentration is detectable, please discuss with medical  on call   [[id number]]  (Abnormal) Collected:  07/22/20 2211    Lab Status:  Final result Specimen:  Blood from Arm, Right Updated:  07/22/20 2238     Acetaminophen Level <2 0 ug/mL     Comprehensive metabolic panel [[id number]] Collected:  07/22/20 2211    Lab Status:  Final result Specimen:  Blood from Arm, Right Updated:  07/22/20 2235     Sodium 141 mmol/L      Potassium 4 3 mmol/L      Chloride 105 mmol/L      CO2 24 mmol/L      ANION GAP 12 mmol/L      BUN 22 mg/dL      Creatinine 0 96 mg/dL      Glucose 83 mg/dL      Calcium 9 0 mg/dL      AST 32 U/L      ALT 39 U/L      Alkaline Phosphatase 94 U/L      Total Protein 6 7 g/dL      Albumin 3 9 g/dL      Total Bilirubin 0 20 mg/dL      eGFR 68 ml/min/1 73sq m     Narrative:       [place] guidelines for Chronic Kidney Disease (CKD):     Stage 1 with normal or high GFR (GFR > 90 mL/min/1 73 square meters)    Stage 2 Mild CKD (GFR = 60-89 mL/min/1 73 square meters)    Stage 3A Moderate CKD (GFR = 45-59 mL/min/1 73 square meters)    Stage 3B Moderate CKD (GFR = 30-44 mL/min/1 73 square meters)    Stage 4 Severe CKD (GFR = 15-29 mL/min/1 73 square meters)    Stage 5 End Stage CKD (GFR <15 mL/min/1 73 square meters)  Note: GFR calculation is accurate only with a steady state creatinine    Magnesium [[id number]]  (Normal) Collected:  07/22/20 2211    Lab Status:  Final result Specimen:  Blood from Arm, Right Updated:  07/22/20 2235     Magnesium 2 4 mg/dL     Salicylate level [[id number]]  (Normal) Collected:  07/22/20 2211    Lab Status:  Final result Specimen:  Blood from Arm, Right Updated:  02/13/92 2614     Salicylate Lvl 3 2 mg/dL     Ethanol [[id number]]  (Abnormal) Collected:  07/22/20 2211    Lab Status:  Final result Specimen:  Blood from Arm, Right Updated:  07/22/20 2233     Ethanol Lvl 218 mg/dL     CBC and differential [[id number]]  (Normal) Collected:  07/22/20 2211    Lab Status:  Final result Specimen:  Blood from Arm, Right Updated:  07/22/20 2218     WBC 8 45 Thousand/uL      RBC 4 87 Million/uL      Hemoglobin 15 1 g/dL      Hematocrit 44 6 %      MCV 92 fL      MCH 31 0 pg      MCHC 33 9 g/dL      RDW 14 9 %      MPV 9 6 fL      Platelets 456 Thousands/uL      Neutrophils Relative 45 %      Lymphocytes Relative 42 %      Monocytes Relative 8 %      Eosinophils Relative 4 %      Basophils Relative 1 %      Neutrophils Absolute 3 79 Thousands/µL      Lymphocytes Absolute 3 54 Thousands/µL      Monocytes Absolute 0 71 Thousand/µL      Eosinophils Absolute 0 37 Thousand/µL      Basophils Absolute 0 04 Thousands/µL                  XR chest 1 view portable   ED Interpretation by Demetrio Sepulveda DO (07/23 0035)   ETT above césar  Final Result by Tiara Acuna MD (07/23 0815)      Questionable right middle lobe infiltrate  No pneumothorax or pleural effusion  Findings concur with the preliminary report by the referring clinician already in PACS and/or our electronic record EPIC  Workstation performed: [id number]         XR chest 1 view portable   Final Result by Alcon Lima DO (07/23 2282)      Low lung volumes  Moderate-sized patchy opacity suspected in the right mid and lower lung field which could represent infection, aspiration, and/or edema depending on the clinical setting  Lungs otherwise appear grossly clear  Other findings as above            Workstation performed: [id number]                    Procedures  ECG 12 Lead Documentation Only  Date/Time: 7/22/2020 10:12 PM  Performed by: Alberto Rodas DO  Authorized by: Alberto Rodas DO     Indications / Diagnosis:  Overdose  ECG reviewed by me, the ED Provider: yes    Patient location:  ED  Previous ECG:     Previous ECG:  Compared to current    Similarity:  Changes noted    Comparison to cardiac monitor: Yes    Interpretation:     Interpretation: abnormal    Rate:     ECG rate:  106bpm    ECG rate assessment: tachycardic    Rhythm:     Rhythm: sinus tachycardia    Ectopy:     Ectopy: none    QRS:     QRS axis:  Normal  Conduction:     Conduction: abnormal      Abnormal conduction: complete LBBB    CriticalCare Time  Performed by: Alberto Rodas DO  Authorized by: Alberto Rodas DO     Critical care provider statement:     Critical care time (minutes):  35    Critical care time was exclusive of:  Separately billable procedures and treating other patients and teaching time    Critical care was necessary to treat or prevent imminent or life-threatening deterioration of the following conditions:  Respiratory failure    Critical care was time spent personally by me on the following activities:  Blood draw for specimens, obtaining history from patient or surrogate, development of treatment plan with patient or surrogate, discussions with consultants, evaluation of patient's response to treatment, examination of patient, interpretation of cardiac output measurements, ordering and performing treatments and interventions, ordering and review of laboratory studies, ordering and review of radiographic studies, re-evaluation of patient's condition and ventilator management    I assumed direction of critical care for this patient from another provider in my specialty: no    Intubation  Date/Time: 7/22/2020 11:30 PM  Performed by: Prabhjot Wu DO  Authorized by: Prabhjot Wu DO     Patient location:  ED  Other Assisting Provider: No    Consent:     Consent obtained:  Emergent situation    Consent given by:  Spouse    Risks discussed:  Aspiration    Alternatives discussed:  No treatment  Universal protocol:     Site/side marked: yes      Immediately prior to procedure, a time out was called: yes      Patient identity confirmed:  Arm band  Pre-procedure details:     Patient status:  Altered mental status    Mallampati score:  2    Pretreatment medications:  Etomidate    Paralytics:  Vecuronium  Indications:     Indications for intubation: respiratory failure, airway protection and hypoxemia    Procedure details:     Preoxygenation:  Bag valve mask    CPR in progress: no      Intubation method:  Oral    Oral intubation technique:  Direct and glidescope    Laryngoscope blade: Mac 4    Tube size (mm):  7 5    Tube type:  Cuffed    Number of attempts:  2    Ventilation between attempts: yes      Cricoid pressure: yes      Tube visualized through cords: yes    Placement assessment:     ETT to lip:  23    ETT to teeth:  24    Tube secured with:  ETT cornejo    Breath sounds:  Equal    Placement verification: chest rise, condensation, CXR verification, direct visualization, equal breath sounds and tube exhalation      CXR findings:  ETT in proper place    Ventilator settings:  16, 450, 8 PEEP, 100%   Post-procedure details:     Patient tolerance of procedure:   Tolerated well, no immediate complications             ED Course  ED Course as of Jul 23 0720 Wed Jul 22, 2020   2212 Call to poison control  US AUDIT      Most Recent Value   Initial Alcohol Screen: US AUDIT-C    1  How often do you have a drink containing alcohol? 6 Filed at: 07/22/2020 2155   Audit-C Score  6 Filed at: 07/22/2020 2155                  SHELBI/DAST-10      Most Recent Value   How many times in the past year have you    Used an illegal drug or used a prescription medication for non-medical reasons? Never Filed at: 07/22/2020 2155                                MDM  Number of Diagnoses or Management Options  Alcohol intoxication ([place] [address] ): Overdose, intentional self-harm, initial encounter [facility]):   Respiratory failure with hypoxia [facility]):   Suicide attempt [facility]):   Diagnosis management comments: Pt more hypoxic, with rhonchi on reeval  Pt also more agitated, less responsive and difficult to redirect  Decision made to intubate patient, for airway protection          Amount and/or Complexity of Data Reviewed  Clinical lab tests: ordered and reviewed  Tests in the radiology section of CPT®: ordered and reviewed    Risk of Complications, Morbidity, and/or Mortality  Presenting problems: high  Diagnostic procedures: high  Management options: high    Patient Progress  Patient progress: improved        Disposition  Final diagnoses:   Overdose, intentional self-harm, initial encounter ([place] [address] )   Suicide attempt ([place] [address] )   Alcohol intoxication ([place] [address] )   Respiratory failure with hypoxia ([place] [address] )     Time reflects when diagnosis was documented in both MDM as applicable and the Disposition within this note     Time User Action Codes Description Comment    7/22/2020 11:57 PM Paulo CARUSO Add [T50 902A] Overdose, intentional self-harm, initial encounter ([place] [address] )     7/22/2020 11:57 PM Oyesanmi, Gl  [address] Suicide attempt ([place] [address] )     7/22/2020 11:57 PM Paulo Collazo Add [F10 929] Alcohol intoxication ([place] [address] ) 7/22/2020 11:57 PM Ronnie Conley Add [J96 91] Respiratory failure with hypoxia [facility])       ED Disposition     ED Disposition Condition Date/Time Comment    Admit Stable Wed Jul 22, 2020 11:57 PM Case was discussed with ROHAN Fernandez and the patient's admission status was agreed to be Admission Status: inpatient status to the service of Dr Devonte Iniguez   Follow-up Information    None         Current Discharge Medication List      CONTINUE these medications which have NOT CHANGED    Details   cetirizine (ZyrTEC) 10 mg tablet Take 10 mg by mouth daily      Doxylamine Succinate, Sleep, (UNISOM PO) Take by mouth as needed      DULoxetine (CYMBALTA) 60 mg delayed release capsule Take 120 mg by mouth daily      ferrous sulfate 325 (65 Fe) mg tablet Take 325 mg by mouth 2 (two) times a day with meals      lamoTRIgine (LaMICtal) 150 MG tablet Take 150 mg by mouth daily      LORazepam (ATIVAN) 1 mg tablet Take 1 mg by mouth as needed for anxiety      senna (SENOKOT) 8 6 MG tablet Take 1 tablet by mouth as needed for constipation      SUMAtriptan (IMITREX) 100 mg tablet Take 100 mg by mouth once as needed for migraine           No discharge procedures on file      PDMP Review     None          ED Provider  Electronically Signed by           Bee Horan DO  07/23/20 3560

## 2020-07-23 NOTE — PLAN OF CARE
Problem: Potential for Falls  Goal: Patient will remain free of falls  Description  INTERVENTIONS:  - Assess patient frequently for physical needs  -  Identify cognitive and physical deficits and behaviors that affect risk of falls  -  [place] fall precautions as indicated by assessment   - Educate patient/family on patient safety including physical limitations  - Instruct patient to call for assistance with activity based on assessment  - Modify environment to reduce risk of injury  - Consider OT/PT consult to assist with strengthening/mobility  Outcome: Progressing     Problem: RESPIRATORY - ADULT  Goal: Achieves optimal ventilation and oxygenation  Description  INTERVENTIONS:  - Assess for changes in respiratory status  - Assess for changes in mentation and behavior  - Position to facilitate oxygenation and minimize respiratory effort  - Oxygen administered by appropriate delivery if ordered  - Initiate smoking cessation education as indicated  - Encourage broncho-pulmonary hygiene including cough, deep breathe, Incentive Spirometry  - Assess the need for suctioning and aspirate as needed  - Assess and instruct to report SOB or any respiratory difficulty  - Respiratory Therapy support as indicated  Outcome: Progressing     Problem: Nutrition/Hydration-ADULT  Goal: Nutrient/Hydration intake appropriate for improving, restoring or maintaining nutritional needs  Description  Monitor and assess patient's nutrition/hydration status for malnutrition  Collaborate with interdisciplinary team and initiate plan and interventions as ordered  Monitor patient's weight and dietary intake as ordered or per policy  Utilize nutrition screening tool and intervene as necessary  Determine patient's food preferences and provide high-protein, high-caloric foods as appropriate       INTERVENTIONS:  - Monitor oral intake, urinary output, labs, and treatment plans  - Assess nutrition and hydration status and recommend course of action  - Evaluate amount of meals eaten  - Assist patient with eating if necessary   - Allow adequate time for meals  - Recommend/ encourage appropriate diets, oral nutritional supplements, and vitamin/mineral supplements  - Order, calculate, and assess calorie counts as needed  - Recommend, monitor, and adjust tube feedings and TPN/PPN based on assessed needs  - Assess need for intravenous fluids  - Provide specific nutrition/hydration education as appropriate  - Include patient/family/caregiver in decisions related to nutrition  Outcome: Progressing     Problem: Prexisting or High Potential for Compromised Skin Integrity  Goal: Skin integrity is maintained or improved  Description  INTERVENTIONS:  - Identify patients at risk for skin breakdown  - Assess and monitor skin integrity  - Assess and monitor nutrition and hydration status  - Monitor labs   - Assess for incontinence   - Turn and reposition patient  - Assist with mobility/ambulation  - Relieve pressure over bony prominences  - Avoid friction and shearing  - Provide appropriate hygiene as needed including keeping skin clean and dry  - Evaluate need for skin moisturizer/barrier cream  - Collaborate with interdisciplinary team   - Patient/family teaching  - Consider wound care consult   Outcome: Progressing     Problem: SAFETY,RESTRAINT: NV/NON-SELF DESTRUCTIVE BEHAVIOR  Goal: Remains free of harm/injury (restraint for non violent/non self-detsructive behavior)  Description  INTERVENTIONS:  - Instruct patient/family regarding restraint use   - Assess and monitor physiologic and psychological status   - Provide interventions and comfort measures to meet assessed patient needs   - Identify and implement measures to help patient regain control  - Assess readiness for release of restraint   Outcome: Progressing  Goal: Returns to optimal restraint-free functioning  Description  INTERVENTIONS:  - Assess the patient's behavior and symptoms that indicate continued need for restraint  - Identify and implement measures to help patient regain control  - Assess readiness for release of restraint   Outcome: Progressing     Problem: SAFETY ADULT  Goal: Patient will remain free of falls  Description  INTERVENTIONS:  - Assess patient frequently for physical needs  -  Identify cognitive and physical deficits and behaviors that affect risk of falls  -  [place] fall precautions as indicated by assessment   - Educate patient/family on patient safety including physical limitations  - Instruct patient to call for assistance with activity based on assessment  - Modify environment to reduce risk of injury  - Consider OT/PT consult to assist with strengthening/mobility  Outcome: Progressing  Goal: Maintain or return to baseline ADL function  Description  INTERVENTIONS:  - Assess patient frequently for physical needs  -  Identify cognitive and physical deficits and behaviors that affect risk of falls    -  [place] fall precautions as indicated by assessment   - Educate patient/family on patient safety including physical limitations  - Instruct patient to call for assistance with activity based on assessment  - Modify environment to reduce risk of injury  - Consider OT/PT consult to assist with strengthening/mobility  Outcome: Progressing  Goal: Maintain or return mobility status to optimal level  Description  INTERVENTIONS:  -  Assess patient's ability to carry out ADLs; assess patient's baseline for ADL function and identify physical deficits which impact ability to perform ADLs (bathing, care of mouth/teeth, toileting, grooming, dressing, etc )  - Assess/evaluate cause of self-care deficits   - Assess range of motion  - Assess patient's mobility; develop plan if impaired  - Assess patient's need for assistive devices and provide as appropriate  - Encourage maximum independence but intervene and supervise when necessary  - Involve family in performance of ADLs  - Assess for home care needs following discharge   - Consider OT consult to assist with ADL evaluation and planning for discharge  - Provide patient education as appropriate  Outcome: Progressing     Problem: DISCHARGE PLANNING  Goal: Discharge to home or other facility with appropriate resources  Description  INTERVENTIONS:  - Identify barriers to discharge w/patient and caregiver  - Arrange for needed discharge resources and transportation as appropriate  - Identify discharge learning needs (meds, wound care, etc )  - Arrange for interpretive services to assist at discharge as needed  - Refer to Case Management Department for coordinating discharge planning if the patient needs post-hospital services based on physician/advanced practitioner order or complex needs related to functional status, cognitive ability, or social support system  Outcome: Progressing     Problem: Knowledge Deficit  Goal: Patient/family/caregiver demonstrates understanding of disease process, treatment plan, medications, and discharge instructions  Description  Complete learning assessment and assess knowledge base    Interventions:  - Provide teaching at level of understanding  - Provide teaching via preferred learning methods  Outcome: Progressing     Problem: CARDIOVASCULAR - ADULT  Goal: Maintains optimal cardiac output and hemodynamic stability  Description  INTERVENTIONS:  - Monitor I/O, vital signs and rhythm  - Monitor for S/S and trends of decreased cardiac output  - Administer and titrate ordered vasoactive medications to optimize hemodynamic stability  - Assess quality of pulses, skin color and temperature  - Assess for signs of decreased coronary artery perfusion  - Instruct patient to report change in severity of symptoms  Outcome: Progressing  Goal: Absence of cardiac dysrhythmias or at baseline rhythm  Description  INTERVENTIONS:  - Continuous cardiac monitoring, vital signs, obtain 12 lead EKG if ordered  - Administer antiarrhythmic and heart rate control medications as ordered  - Monitor electrolytes and administer replacement therapy as ordered  Outcome: Progressing     Problem: GENITOURINARY - ADULT  Goal: Maintains or returns to baseline urinary function  Description  INTERVENTIONS:  - Assess urinary function  - Encourage oral fluids to ensure adequate hydration if ordered  - Administer IV fluids as ordered to ensure adequate hydration  - Administer ordered medications as needed  - Offer frequent toileting  - Follow urinary retention protocol if ordered  Outcome: Progressing  Goal: Urinary catheter remains patent  Description  INTERVENTIONS:  - Assess patency of urinary catheter  - If patient has a chronic post, consider changing catheter if non-functioning  - Follow guidelines for intermittent irrigation of non-functioning urinary catheter  Outcome: Progressing     Problem: METABOLIC, FLUID AND ELECTROLYTES - ADULT  Goal: Electrolytes maintained within normal limits  Description  INTERVENTIONS:  - Monitor labs and assess patient for signs and symptoms of electrolyte imbalances  - Administer electrolyte replacement as ordered  - Monitor response to electrolyte replacements, including repeat lab results as appropriate  - Instruct patient on fluid and nutrition as appropriate  Outcome: Progressing  Goal: Fluid balance maintained  Description  INTERVENTIONS:  - Monitor labs   - Monitor I/O and WT  - Instruct patient on fluid and nutrition as appropriate  - Assess for signs & symptoms of volume excess or deficit  Outcome: Progressing  Goal: Glucose maintained within target range  Description  INTERVENTIONS:  - Monitor Blood Glucose as ordered  - Assess for signs and symptoms of hyperglycemia and hypoglycemia  - Administer ordered medications to maintain glucose within target range  - Assess nutritional intake and initiate nutrition service referral as needed  Outcome: Progressing     Problem: SKIN/TISSUE INTEGRITY - ADULT  Goal: Skin integrity remains intact  Description  INTERVENTIONS  - Identify patients at risk for skin breakdown  - Assess and monitor skin integrity  - Assess and monitor nutrition and hydration status  - Monitor labs (i e  albumin)  - Assess for incontinence   - Turn and reposition patient  - Assist with mobility/ambulation  - Relieve pressure over bony prominences  - Avoid friction and shearing  - Provide appropriate hygiene as needed including keeping skin clean and dry  - Evaluate need for skin moisturizer/barrier cream  - Collaborate with interdisciplinary team (i e  Nutrition, Rehabilitation, etc )   - Patient/family teaching  Outcome: Progressing     Problem: MUSCULOSKELETAL - ADULT  Goal: Maintain or return mobility to safest level of function  Description  INTERVENTIONS:  - Assess patient's ability to carry out ADLs; assess patient's baseline for ADL function and identify physical deficits which impact ability to perform ADLs (bathing, care of mouth/teeth, toileting, grooming, dressing, etc )  - Assess/evaluate cause of self-care deficits   - Assess range of motion  - Assess patient's mobility  - Assess patient's need for assistive devices and provide as appropriate  - Encourage maximum independence but intervene and supervise when necessary  - Involve family in performance of ADLs  - Assess for home care needs following discharge   - Consider OT consult to assist with ADL evaluation and planning for discharge  - Provide patient education as appropriate  Outcome: Progressing  Goal: Maintain proper alignment of affected body part  Description  INTERVENTIONS:  - Support, maintain and protect limb and body alignment  - Provide patient/ family with appropriate education  Outcome: Progressing   Care plan ongoing

## 2020-07-23 NOTE — SOCIAL WORK
LOS - 1 day    SW following to assist as needed  Pt is currently in ICU  Intubated  Pt was admitted following overdose/suicide attempt  Per notes once pt it medically stable pt will be evaluated by psychiatrist for inpatient psychiatric care

## 2020-07-23 NOTE — CONSULTS
Consultation - Medical Toxicology  Heydi Covarrubias 49 y o  female MRN: [id number]  Unit/Bed#: ICU 01 Encounter: [id number]      Reason for Consult / Principal Problem: overdose  Inpatient consult to Toxicology  Consult performed by: Marilyn Ac DO  Consult ordered by: Je Whitmore DO        07/23/20      ASSESSMENT:  49year-old female with acute, intentional overdose  1  Acute intentional overdose  2  Anticholinergic toxicity   3  QRS prolongation  4  QTc prolongation  5  Elevated blood pressure  6  Encephalopathy  7  Respiratory failure   8  Possible aspiration   9  Ethanol intoxication       RECOMMENDATIONS:  Please continue supportive care, including cardiac and respiratory monitoring, IV fluids, and benzodiazepines PRN for agitation  History of encephalopathy, tachycardia, garbled speech, large urinary output on post insertion, and report of diphenhhydramine overdose are consistent with anticholinergic toxicity  Her QRS and QTc prolongation may occur with diphenhydramine, duloxetine or lamotrigine overdose as per known availability  Please continue supportive care, PRN benzodiazepines/propofol, electrolyte optimization and hydration  Anticholinergic toxidrome generally lasts 12 to 36 hours  Since her QRS has normalized, bicarbonate administration is not indicated  QTc has also normalized  Elevated blood pressure is also common with anticholinergic overdose, but her blood pressure readings seem more profound, especially without history of hypertension  Therefore, I would recommend consideration of CT imaging of brain, along with blood pressure control  This elevated blood pressure also raises concern for possible coingestion of her sumatriptan, or possibly her duloxetine  In the case of sumatriptan or other ergot alkaloid overdose, antihypertensives are indicated  Agree with nicardipine infusion  Nitroglycerin and phentolamine are additional considerations    Please monitor closely for any sign of hypertension-related end organ injury, including repeating a troponin and monitoring for digital ischemia  Additionally, please observe for signs of ethanol withdrawal      Patient will need psychiatric evaluation once stable  4:53 PM  At this point, I have evaluated the patient and I do suspect she primarily overdosed on diphenhydramine and was also significantly intoxicated with ethanol  With propofol turned off, patient is able to follow commands, answer yes/no questions by nodding and lift her head off pillow  Given her improvement, I recommend working to extubation  Return of mild tachycardia while off of propofol is consistent with recent anticholinergic overdose, and some degree of hypertension is anticipated  Please continue routine management of her blood pressure, assure no neuro deficits once extubated and no longer sedated  For further questions, please call [facility]  Service or Patient Access Center to reach the medical  on call  Please see additional teaching note below (if available)    Medical Toxicology  [address] [facility]  Anticholinergic Syndrome  Updated 03/04/2008    a- Background: Anticholinergic intoxication can occur from exposure to a wide variety of prescription and over-the-counter medications and numerous plants and mushrooms  Common drugs that have anticholinergic activities include: antihistamines, antipsychotics, antispasmodics, skeletal muscle relaxants, and tricyclic antidepressants (TCAs)  Plants and mushrooms containing anticholinergic alkaloids include: jimsonweed (Datura stramonium), deadly nightshade (Atropa belladonna), and fly agaric (Robert Gill)  b- Mechanism of Toxicity:   Competitively antagonize the effect of acetylcholine at peripheral muscarinic receptors  They mostly affect exocrine glands (such as sweating and salivation) and smooth muscle   Inhibition of muscarinic activities in the heart leads to a rapid heartbeat   Pharmacokinetics: Absorption may be delayed due to its effect on GI motility  Duration of toxic effect can be quite prolonged (e g  benztropine  2-3 days)  c- Toxic dose: The range of toxicity is highly variable and unpredictable   Examples of Fatal doses from case reports:  i  Young Child: 1-2 mg Atropine, instilled in the eye  ii  Adult: 32 mg Atropine, IM injection   Minimal effect: increased heart rate and dry mouth after 360 mg of trospium chloride  d- Clinical presentation: Anticholinergic syndrome is characterized by warm, dry, flushed skin, dry mucous membranes, mydriasis, delirium, tachycardia, ileus, and urinary retention  Jerky myoclonic movements and choreoathetosis are common and can lead to rhabdomyolysis  Hyperthermia, coma, respiratory arrest and seizures may occur  e- Diagnosis: Based on history of exposure and typical features of anticholinergic syndrome  Trial dose of physostigmine can be used to confirm the diagnosis, especially with rapid reversal of the syndrome  f- Laboratory studies: Not generally available for the anticholinergics, but other labs can be useful such as electrolytes, glucose, CPK, and ECG  g- Treatment:    ABC   Seizure and muscular hyperactivity: Should be treated with benzodiazepines  Treat aggressively to prevent hyperthermia and rhabdomyolysis and their resultant complications  If unsuccessful use phenobarbital or propofol   Delirium: Treat with benzodiazepines, if resistant to benzodiazepines consider treating with physostigmine   GI decontamination maybe helpful in delayed presentation secondary to decreased GI motility provided that the patient is awake and alert   Enhanced elimination: Procedures such as hemodialysis and repeated-dose activated charcoal are not effective in removing anticholinergic agents   Physostigmine:   i  Pharmacology: Physostigmine is a carbamate and a reversible inhibitor of acetylcholinesterase   It increases acetylcholine concentration, causing stimulation of both muscarinic and nicotinic receptors  It also can penetrate the blood-brain barrier  It has nonspecific arousal effects  1  Onset of action: 3-8 minutes after parenteral administration  2  Duration of action: 30-90 minutes  3  Elimination half-life: 15-40 minutes  ii  Indications:  1  Severe anticholinergic syndrome from antimuscarinic agents  Generally used to reverse delirium resistant to benzodiazepines  2  Diagnostically: To differentiate functional psychosis from anticholinergic delirium   iii  Contraindications:  1  Should not be used as an antidote for cyclic antidepressant overdose because it may worsen cardiac conduction disturbance, cause bradyarrythmias or asystole, and aggravate or precipitate seizures  2  Do not use physostigmine with concurrent use of depolarizing neuromuscular blockers (e g  succinylcholine)  3  Known hypersensitivity to agent or preservative  4  Relative contraindication may include: Asthma, peripheral vascular disease, intestinal and bladder blockade, parkinsonian syndrome, and AV Block  iv  Adverse effects:  1  Bradycardia, heart block, and asystole  2  Seizure (particularly with rapid administration or excessive dose)  3  Nausea, vomiting, hypersalivation, and diarrhea  4  Bronchorrhea and bronchospasm  5  Fasciculation and muscle weakness  v  Dosage:  1  Adult: 0 5-2 mg slow IV push (£ 1 mg/min)  2  Children: 0 02 mg/kg slow IV push (£ 0 5 mg/min)  3  Repeat as needed every 10-30 minutes  4  Precautions: Patient should be on a cardiac monitor  Atropine should be kept at bedside to treat excessive muscarinic stimulation  5  Should not be administered IM or as a continuous infusion  6  It is better to undertreat than to overtreat  Physostigmine toxicity results when used in excess or in the absence of antimuscarinic toxicity  References:  Meseret Carlos  Poisoning & Drug Overdose  2007  [facility]  [place] Toxicologic Emergencies  2006  Hx and PE limited by: intubation     HPI: Sherita Powell is a 49y o  year old female who presents with reported overdose on diphenhydramine  She was found confused at home by relative and arrived via EMS  She was reportedly alert and oriented x2 in the ED but then decompensated with depressed mental status, requiring intubation   She had evidence of respiratory acidosis and anticholinergic toxicity  She was persistently hypertensive overnight despite adequate sedation  Upon  Myc evaluation, her mental status was improving  I discussed with ICU team and patient's daughter at bedside  Daughter confirms that suspected overdose was on diphenhydramine, and that she also at least took her nightly lamotrigine and lorazepam        Review of Systems   Unable to perform ROS: Intubated   Psychiatric/Behavioral: Positive for self-injury  Historical Information   Past Medical History:   Diagnosis Date    Depression     Psychiatric disorder      History reviewed  No pertinent surgical history  Social History   Social History     Substance and Sexual Activity   Alcohol Use Yes     Social History     Substance and Sexual Activity   Drug Use Not Currently     Social History     Tobacco Use   Smoking Status Current Every Day Smoker    Packs/day: 1 00   Smokeless Tobacco Never Used     History reviewed  No pertinent family history  Prior to Admission medications    Medication Sig Start Date End Date Taking?  Authorizing Provider   cetirizine (ZyrTEC) 10 mg tablet Take 10 mg by mouth daily    Historical Provider, MD   Doxylamine Succinate, Sleep, (UNISOM PO) Take by mouth as needed    Historical Provider, MD   DULoxetine (CYMBALTA) 60 mg delayed release capsule Take 120 mg by mouth daily    Historical Provider, MD   ferrous sulfate 325 (65 Fe) mg tablet Take 325 mg by mouth 2 (two) times a day with meals    Historical Provider, MD   lamoTRIgine (LaMICtal) 150 MG tablet Take 150 mg by mouth daily    Historical Provider, MD   LORazepam (ATIVAN) 1 mg tablet Take 1 mg by mouth as needed for anxiety    Historical Provider, MD   senna (SENOKOT) 8 6 MG tablet Take 1 tablet by mouth as needed for constipation    Historical Provider, MD   SUMAtriptan (IMITREX) 100 mg tablet Take 100 mg by mouth once as needed for migraine    Historical Provider, MD       Current Facility-Administered Medications   Medication Dose Route Frequency    chlorhexidine (PERIDEX) 0 12 % oral rinse 15 mL  15 mL Swish & Spit Q12H [address]    enoxaparin (LOVENOX) subcutaneous injection 40 mg  40 mg Subcutaneous Daily    folic acid (FOLVITE) tablet 1 mg  1 mg Oral Daily    lamoTRIgine (LaMICtal) tablet 150 mg  150 mg Oral Daily    LORazepam (ATIVAN) injection 2 mg  2 mg Intravenous Q2H PRN    multi-electrolyte (PLASMALYTE-A/ISOLYTE-S PH 7 4) IV solution  100 mL/hr Intravenous Continuous    multivitamin-minerals (CENTRUM) tablet 1 tablet  1 tablet Oral Daily    niCARdipine (CARDENE) 25 mg (STANDARD CONCENTRATION) in sodium chloride 0 9% 250 mL  1-15 mg/hr Intravenous Titrated    [START ON 7/24/2020] pneumococcal 23-valent polysaccharide vaccine (PNEUMOVAX-23) injection 0 5 mL  0 5 mL Intramuscular Prior to discharge    propofol (DIPRIVAN) 1000 mg in 100 mL infusion (premix)  5-50 mcg/kg/min Intravenous Titrated    thiamine (VITAMIN B1) tablet 100 mg  100 mg Oral Daily       Allergies   Allergen Reactions    Penicillins        Objective       Intake/Output Summary (Last 24 hours) at 7/23/2020 1653  Last data filed at 7/23/2020 1620  Gross per 24 hour   Intake 794 63 ml   Output 2425 ml   Net -1630 37 ml       Invasive Devices:   Peripheral IV 07/22/20 Right Forearm (Active)   Site Assessment Clean;Dry; Intact 7/23/2020 12:00 PM   Dressing Type Transparent 7/23/2020 12:00 PM   Line Status Infusing 7/23/2020 12:00 PM   Dressing Status Clean;Dry; Intact 7/23/2020 12:00 PM   Dressing Change Due 07/26/20 7/23/2020  4:00 AM   Reason Not Rotated Not due 7/23/2020 12:00 PM       Peripheral IV 07/22/20 Right Wrist (Active)   Site Assessment Clean;Dry; Intact 7/23/2020 12:00 PM   Dressing Type Transparent 7/23/2020 12:00 PM   Line Status Infusing 7/23/2020 12:00 PM   Dressing Status Clean;Dry; Intact 7/23/2020 12:00 PM   Dressing Change Due 07/26/20 7/23/2020  4:00 AM   Reason Not Rotated Not due 7/23/2020 12:00 PM       NG/OG/Enteral Tube Orogastric Left mouth (Active)   Placement Reverification Auscultation 7/23/2020  8:00 AM   Site Assessment Clean;Dry; Intact 7/23/2020  8:00 AM   Status Suction-low continuous 7/23/2020  8:00 AM   Drainage Appearance Other (Comment) 7/23/2020  8:00 AM       Urethral Catheter Straight-tip 16 Fr  (Active)   Reasons to continue Urinary Catheter  Accurate I&O assessment in critically ill patients (48 hr  max) 7/23/2020  8:00 AM   Goal for Removal Remove after 48 hrs of I/O monitoring 7/23/2020  8:00 AM   Site Assessment Clean;Skin intact 7/23/2020  8:00 AM   Collection Container Standard drainage bag 7/23/2020  8:00 AM   Securement Method Securing device (Describe) 7/23/2020  8:00 AM   Output (mL) 100 mL 7/23/2020 11:06 AM       ETT  7 5 mm (Active)   Secured at (cm) 25 7/23/2020 11:47 AM   Measured from Lips 7/23/2020 11:47 AM   Secured Location Left 7/23/2020 11:47 AM   Repositioned Right to Left 7/23/2020  3:16 AM   Secured by Commercial tube cornejo 7/23/2020 11:47 AM   Site Condition Dry 7/23/2020 11:47 AM   Cuff Pressure (cm H2O) 30 cm H2O 7/23/2020  7:29 AM   HI-LO Suction  Intermittent suction 7/23/2020 11:47 AM   HI-LO Secretions Moderate; Thick;Blood tinged 7/23/2020 11:47 AM   HI-LO Intervention Patent 7/23/2020 11:47 AM       Vitals   Vitals:    07/23/20 1530 07/23/20 1545 07/23/20 1600 07/23/20 1615   BP: (!) 189/81 (!) 174/77 (!) 181/84 163/79   TempSrc:   Temporal    Pulse: 90 89 100 103   Resp: (!) 24 (!) 25 (!) 23 22   Patient Position - Orthostatic VS:       Temp:   97 6 °F (36 4 °C) Physical Exam   Constitutional: She is intubated  HENT:   Head: Normocephalic and atraumatic  Mouth/Throat: Oropharynx is clear and moist    Eyes: Pupils are equal, round, and reactive to light  Conjunctivae and EOM are normal    Neck: Normal range of motion  Cardiovascular: Normal rate  Pulmonary/Chest: Effort normal  She is intubated  Abdominal: Soft  There is no tenderness  Musculoskeletal: Normal range of motion  Neurological: She is alert  She exhibits normal muscle tone  Skin: Skin is warm and dry  Psychiatric: She expresses suicidal ideation  She expresses suicidal plans  Nursing note and vitals reviewed  EKG, Pathology, and Other Studies: I have personally reviewed pertinent reports  Lab Results: I have personally reviewed pertinent reports  Labs:  Results from last 7 days   Lab Units 07/23/20  0435   WBC Thousand/uL 8 73   HEMOGLOBIN g/dL 13 7   HEMATOCRIT % 40 5   PLATELETS Thousands/uL 225   NEUTROS PCT % 56   LYMPHS PCT % 34   MONOS PCT % 7      Results from last 7 days   Lab Units 07/23/20  0435 07/22/20  2211   SODIUM mmol/L 139 141   POTASSIUM mmol/L 3 9 4 3   CHLORIDE mmol/L 106 105   CO2 mmol/L 21 24   BUN mg/dL 20 22   CREATININE mg/dL 0 70 0 96   CALCIUM mg/dL 7 0* 9 0   ALK PHOS U/L  --  94   ALT U/L  --  39   AST U/L  --  32   MAGNESIUM mg/dL 1 9 2 4   PHOSPHORUS mg/dL 2 9  --               0   Lab Value Date/Time    TROPONINI <0 02 07/23/2020 1409    TROPONINI <0 02 07/22/2020 2211         Results from last 7 days   Lab Units 07/22/20  2211   ACETAMINOPHEN LVL ug/mL <2 0*   ETHANOL LVL mg/dL 139*   SALICYLATE LVL mg/dL 3 2     Invalid input(s): EXTPREGUR    Imaging Studies: I have personally reviewed pertinent reports            Minutes of critical care time 39  -Critical care time was exclusive of seperately billable procedures and teaching time    -Critical care was necessary to treat or prevent imminent or life-threatening deterioration of the following condition: cardiac failure, circulatory failure, CNS failure/comprimise, respiratory failure, sepsis, toxidrome   -Critical care time was spent personally by me on the following activities as well as the above as per the course and rest of chart: obtaining history from patient/surrogate, developement of a treatment plan, discussions with primary provider(s), evaluation of patient's response to the treatment, examination of the patient, recommending treatements and interventions, re-evaluation of the patient's condition, review of old charts, recommending/reviewing laboratory studies, recommending/reviewing of radiographic studies

## 2020-07-23 NOTE — UTILIZATION REVIEW
Initial Clinical Review    Admission: Date/Time/Statement: Admission Orders (From admission, onward)     Ordered        07/22/20 2358  Inpatient Admission  Once                   Orders Placed This Encounter   Procedures    Inpatient Admission     Standing Status:   Standing     Number of Occurrences:   1     Order Specific Question:   Admitting Physician     Answer:   Brent Sidhu [[id number]]     Order Specific Question:   Level of Care     Answer:   Critical Care [15]     Order Specific Question:   Estimated length of stay     Answer:   More than 2 Midnights     Order Specific Question:   Certification     Answer:   I certify that inpatient services are medically necessary for this patient for a duration of greater than two midnights  See H&P and MD Progress Notes for additional information about the patient's course of treatment  ED Arrival Information     Expected Arrival Acuity Means of Arrival Escorted By Service Admission Type    - 7/22/2020 21:48 Immediate Wheelchair Family Member Critical Care/ICU Emergency    Arrival Complaint    Overdose        Chief Complaint   Patient presents with    Overdose - Intentional     told  she texted father and told him she was going to kill herself  having prob with son  took normal bedtime meds  has been drinking wine all day and told  she took about 18 benadryl     Assessment/Plan: 49 y o  female w/ pmh of bipolar depression and history of suicide attempt  Per  family has been under a lot of stress lately   found her altered with an empty bottle of wine and bendadryl next to her  He called into the ED after EMS was taking to long and brought her in by private vehicle  The patient  2L IVF bolus, UDS negative, etoh 218 and ultimately required intubation for airway protection  Admitted inpatient ICU Anticholinergic drug overdose continue propofol infusiotn for sedation ,prn ativan for seizure activity  IVF for light hydration monitor I/o  Respiratory Failure ,acute hypoxic hypercapnic continue mechanical ventilation  Etoh  CIWA protocol  Suicide attempt pill count ingested approximately 15-20 tabs benardyl etoh 218 on arrival    ED Triage Vitals   Temperature Pulse Respirations Blood Pressure SpO2   07/22/20 2218 07/22/20 2200 07/22/20 2200 07/22/20 2200 07/22/20 2200   99 °F (37 2 °C) (!) 106 (!) 25 (!) 185/90 98 %      Temp Source Heart Rate Source Patient Position - Orthostatic VS BP Location FiO2 (%)   07/22/20 2218 07/23/20 0130 07/23/20 0130 07/23/20 0130 --   Tympanic Monitor Lying Left arm       Pain Score       --               Wt Readings from Last 1 Encounters:   07/23/20 96 6 kg (212 lb 15 4 oz)     Additional Vital Signs:   07/23/20 0400  99 1 °F (37 3 °C)  85  22  144/93  111  99 %       07/23/20 0300    86  22  149/101Abnormal   119  99 %  Ventilator     07/23/20 0145            95 %       07/23/20 0144              Ventilator     07/23/20 0130    93  22  174/113Abnormal   139  99 %  Ventilator  Lying   07/23/20 0118    94  27Abnormal       94 %       07/23/20 0100        197/123Abnormal   152         07/23/20 0047            94 %       07/23/20 0030    100  16  198/114Abnormal   151  94 %       07/23/20 0015    100  15  170/95  126  95 %  Ventilator     07/23/20 0000    104  15  162/85  114  91 %       07/22/20 2345    108Abnormal   22  240/144Abnormal   184  90 %       07/22/20 2315    114Abnormal   34Abnormal       89 %Abnormal        07/22/20 2300    110Abnormal   28Abnormal   244/141Abnormal   185  91 %       07/22/20 2245    106Abnormal   24Abnormal   218/128Abnormal   166  95 %       07/22/20 2230    104  23Abnormal   197/98Abnormal   140  97 %           Pertinent Labs/Diagnostic Test Results:  7/23 0413 CXR  Questionable right middle lobe infiltrate   No pneumothorax or pleural effusion  Findings concur with the preliminary   7/23 0539 Low lung volumes   Moderate-sized patchy opacity suspected in the right mid and lower lung field which could represent infection, aspiration, and/or edema depending on the clinical setting   Lungs otherwise appear grossly clear      Results from last 7 days   Lab Units 07/22/20  2235   SARS-COV-2  Negative     Results from last 7 days   Lab Units 07/23/20  0435 07/22/20  2211   WBC Thousand/uL 8 73 8 45   HEMOGLOBIN g/dL 13 7 15 1   HEMATOCRIT % 40 5 44 6   PLATELETS Thousands/uL 225 268   NEUTROS ABS Thousands/µL 4 90 3 79     Results from last 7 days   Lab Units 07/23/20  0435 07/22/20  2211   SODIUM mmol/L 139 141   POTASSIUM mmol/L 3 9 4 3   CHLORIDE mmol/L 106 105   CO2 mmol/L 21 24   ANION GAP mmol/L 12 12   BUN mg/dL 20 22   CREATININE mg/dL 0 70 0 96   EGFR ml/min/1 73sq m 99 68   CALCIUM mg/dL 7 0* 9 0   MAGNESIUM mg/dL 1 9 2 4   PHOSPHORUS mg/dL 2 9  --      Results from last 7 days   Lab Units 07/22/20  2211   AST U/L 32   ALT U/L 39   ALK PHOS U/L 94   TOTAL PROTEIN g/dL 6 7   ALBUMIN g/dL 3 9   TOTAL BILIRUBIN mg/dL 0 20     Results from last 7 days   Lab Units 07/23/20  0435 07/22/20  2211   GLUCOSE RANDOM mg/dL 105 83     Results from last 7 days   Lab Units 07/23/20  0728 07/23/20  0027   PH ART  7 420 7 191*   PCO2 ART mm Hg 30 3* 53 4*   PO2 ART mm Hg 83 1 83 7   HCO3 ART mmol/L 19 2* 20 0*   BASE EXC ART mmol/L -4 0 -8 6   O2 CONTENT ART mL/dL 18 9 18 3   O2 HGB, ARTERIAL % 94 6 90 3*   ABG SOURCE  Radial, Left Radial, Left     Results from last 7 days   Lab Units 07/22/20  2211   TROPONIN I ng/mL <0 02     Results from last 7 days   Lab Units 07/22/20 2230   AMPH/METH  Negative   BARBITURATE UR  Negative   BENZODIAZEPINE UR  Negative   COCAINE UR  Negative   METHADONE URINE  Negative   OPIATE UR  Negative   PCP UR  Negative   THC UR  Negative     Results from last 7 days   Lab Units 07/22/20  2211   ETHANOL LVL mg/dL 218*   ACETAMINOPHEN LVL ug/mL <2 0*   SALICYLATE LVL mg/dL 3 2     ED Treatment:   Medication Administration from 07/22/2020 2148 to 07/23/2020 0118       Date/Time Order Dose Route Action Action by Comments     07/22/2020 2311 sodium chloride 0 9 % bolus 1,000 mL 0 mL Intravenous Stopped [facility] V, RN      07/22/2020 2215 sodium chloride 0 9 % bolus 1,000 mL 1,000 mL Intravenous New Bag Franchesca HOLLAND Sarah, RN      07/22/2020 2320 LORazepam (ATIVAN) injection 2 mg 2 mg Intravenous Given [facility] V, RN      07/22/2020 2320 etomidate (AMIDATE) 2 mg/mL injection 20 mg 20 mg Intravenous Given [facility] V, RN      07/22/2020 2320 vecuronium (NORCURON) injection 10 mg 10 mg Intravenous Given [facility] V, RN      07/23/2020 0007 LORazepam (ATIVAN) injection 2 mg 2 mg Intravenous Given [facility] V, RN      07/23/2020 0100 propofol (DIPRIVAN) 1000 mg in 100 mL infusion (premix) 60 mcg/kg/min Intravenous Rate/Dose Change Semaj James RN      07/23/2020 0049 propofol (DIPRIVAN) 1000 mg in 100 mL infusion (premix) 40 mcg/kg/min Intravenous Rate/Dose Change Caitlin Cobos V RN      07/23/2020 0016 propofol (DIPRIVAN) 1000 mg in 100 mL infusion (premix) 20 mcg/kg/min Intravenous Rate/Dose Change Caitlin Cobos V RN      07/23/2020 0007 propofol (DIPRIVAN) 1000 mg in 100 mL infusion (premix) 10 mcg/kg/min [facility]      07/23/2020 0008 propofol (DIPRIVAN) 200 MG/20ML bolus injection 90 mg 90 mg Intravenous Given Caitlin Cobos V RN given by dr Estuardo Reilly     07/23/2020 0016 sodium chloride 0 9 % bolus 1,000 mL 1,000 mL Intravenous New Bag Caitlin MELARA RN         Past Medical History:   Diagnosis Date    Depression     Psychiatric disorder      Present on Admission:   Anticholinergic drug overdose   Obese   Bipolar depression ([address] )   Suicide attempt ([facility] )   ETOH abuse      Admitting Diagnosis: Alcohol intoxication ([facility] ) [F10 929]  Suicide attempt ([facility] [facility] ) Asuncion Blight  Res[facility] with [facility] ) [J96 91]  Accidental overdose [T51 017J]  Overdose, intentional self-harm, initial encounter ([facility] ) Luz Combe  Age/Sex: 49 y o  female  Admission Orders:  icu  Mechanical ventilation  Respiratory protocol  Neuro checks  Suction bid  orogastric tube insertion  mrsa cx  Lamotrigine level   npo  Scheduled Medications:    Medications:  chlorhexidine 15 mL [employer] Q12H [address]   enoxaparin 40 mg Subcutaneous Daily   folic acid 1 mg Oral Daily   lamoTRIgine 150 mg Oral Daily   magnesium sulfate 2 g Intravenous Once   multivitamin-minerals 1 tablet Oral Daily   thiamine 100 mg Oral Daily     Continuous IV Infusions:    multi-electrolyte 100 mL/hr Intravenous Continuous   propofol 5-50 mcg/kg/min Intravenous Titrated     PRN Meds:    LORazepam 2 mg Intravenous Q2H PRN   [START ON 7/24/2020] pneumococcal 23-valent polysaccharide vaccine 0 5 mL Intramuscular Prior to discharge       IP CONSULT TO CASE MANAGEMENT    Network Utilization Review Department  [email or web address]  ATTENTION: Please call with any questions or concerns to [phone] and carefully listen to the prompts so that you are directed to the right person  All voicemails are confidential   Mami Benitez all requests for admission clinical reviews, approved or denied determinations and any other requests to dedicated fax number below belonging to the campus where the patient is receiving treatment   List of dedicated fax numbers for the Facilities:  FACILITY NAME UR FAX NUMBER   ADMISSION DENIALS (Administrative/Medical Necessity) [phone]   [address]  (Maternity/NICU/Pediatrics) [phone]     Gavino Robles [phone]   [facility] [phone]   [facility] [facility] Niesha Estação  [address] [facility] [address] [facility] [phone]

## 2020-07-23 NOTE — PLAN OF CARE
Problem: RESPIRATORY - ADULT  Goal: Achieves optimal ventilation and oxygenation  Description  INTERVENTIONS:  - Assess for changes in respiratory status  - Assess for changes in mentation and behavior  - Position to facilitate oxygenation and minimize respiratory effort  - Oxygen administered by appropriate delivery if ordered  - Initiate smoking cessation education as indicated  - Encourage broncho-pulmonary hygiene including cough, deep breathe, Incentive Spirometry  - Assess the need for suctioning and aspirate as needed  - Assess and instruct to report SOB or any respiratory difficulty  - Respiratory Therapy support as indicated  -ventilator  Outcome: Progressing

## 2020-07-23 NOTE — ED NOTES
at the bedside  Assured that the patient is going to have twitching and that is a result of the benadryl  Made aware that it can last awhile  He expressed some concern that she is very sleepy and he is continually trying to wake her up  Reassured him that she is going to be sleepy and she is on the monitor and someone is going to sit with her and watch her    Verbalized understanding     Tiffany Hoang RN  07/22/20 3428

## 2020-07-24 ENCOUNTER — APPOINTMENT (INPATIENT)
Dept: RADIOLOGY | Facility: HOSPITAL | Age: 55
DRG: 917 | End: 2020-07-24
Payer: COMMERCIAL

## 2020-07-24 PROBLEM — J96.00 RESPIRATORY FAILURE, ACUTE (HCC): Status: RESOLVED | Noted: 2020-07-23 | Resolved: 2020-07-24

## 2020-07-24 LAB
ALBUMIN SERPL BCP-MCNC: 2.9 G/DL (ref 3.5–5)
ALP SERPL-CCNC: 91 U/L (ref 46–116)
ALT SERPL W P-5'-P-CCNC: 22 U/L (ref 12–78)
ANION GAP SERPL CALCULATED.3IONS-SCNC: 6 MMOL/L (ref 4–13)
AST SERPL W P-5'-P-CCNC: 23 U/L (ref 5–45)
BASOPHILS # BLD AUTO: 0.06 THOUSANDS/ΜL (ref 0–0.1)
BASOPHILS NFR BLD AUTO: 1 % (ref 0–1)
BILIRUB SERPL-MCNC: 0.7 MG/DL (ref 0.2–1)
BUN SERPL-MCNC: 9 MG/DL (ref 5–25)
CALCIUM SERPL-MCNC: 7.2 MG/DL (ref 8.3–10.1)
CHLORIDE SERPL-SCNC: 101 MMOL/L (ref 100–108)
CO2 SERPL-SCNC: 26 MMOL/L (ref 21–32)
CREAT SERPL-MCNC: 0.87 MG/DL (ref 0.6–1.3)
EOSINOPHIL # BLD AUTO: 0.39 THOUSAND/ΜL (ref 0–0.61)
EOSINOPHIL NFR BLD AUTO: 3 % (ref 0–6)
ERYTHROCYTE [DISTWIDTH] IN BLOOD BY AUTOMATED COUNT: 14.9 % (ref 11.6–15.1)
GFR SERPL CREATININE-BSD FRML MDRD: 76 ML/MIN/1.73SQ M
GLUCOSE SERPL-MCNC: 98 MG/DL (ref 65–140)
HCT VFR BLD AUTO: 40.9 % (ref 34.8–46.1)
HGB BLD-MCNC: 13.4 G/DL (ref 11.5–15.4)
LYMPHOCYTES # BLD AUTO: 2.48 THOUSANDS/ΜL (ref 0.6–4.47)
LYMPHOCYTES NFR BLD AUTO: 22 % (ref 14–44)
MAGNESIUM SERPL-MCNC: 2.5 MG/DL (ref 1.6–2.6)
MCH RBC QN AUTO: 30.6 PG (ref 26.8–34.3)
MCHC RBC AUTO-ENTMCNC: 32.8 G/DL (ref 31.4–37.4)
MCV RBC AUTO: 93 FL (ref 82–98)
MONOCYTES # BLD AUTO: 0.91 THOUSAND/ΜL (ref 0.17–1.22)
MONOCYTES NFR BLD AUTO: 8 % (ref 4–12)
MRSA NOSE QL CULT: NORMAL
NEUTROPHILS # BLD AUTO: 7.5 THOUSANDS/ΜL (ref 1.85–7.62)
NEUTS SEG NFR BLD AUTO: 66 % (ref 43–75)
PHOSPHATE SERPL-MCNC: 2.2 MG/DL (ref 2.7–4.5)
PLATELET # BLD AUTO: 189 THOUSANDS/UL (ref 149–390)
PMV BLD AUTO: 9.5 FL (ref 8.9–12.7)
POTASSIUM SERPL-SCNC: 3.5 MMOL/L (ref 3.5–5.3)
PROT SERPL-MCNC: 5.4 G/DL (ref 6.4–8.2)
RBC # BLD AUTO: 4.38 MILLION/UL (ref 3.81–5.12)
SODIUM SERPL-SCNC: 133 MMOL/L (ref 136–145)
WBC # BLD AUTO: 11.34 THOUSAND/UL (ref 4.31–10.16)

## 2020-07-24 PROCEDURE — 80053 COMPREHEN METABOLIC PANEL: CPT | Performed by: STUDENT IN AN ORGANIZED HEALTH CARE EDUCATION/TRAINING PROGRAM

## 2020-07-24 PROCEDURE — 90732 PPSV23 VACC 2 YRS+ SUBQ/IM: CPT | Performed by: STUDENT IN AN ORGANIZED HEALTH CARE EDUCATION/TRAINING PROGRAM

## 2020-07-24 PROCEDURE — 83735 ASSAY OF MAGNESIUM: CPT | Performed by: STUDENT IN AN ORGANIZED HEALTH CARE EDUCATION/TRAINING PROGRAM

## 2020-07-24 PROCEDURE — 99232 SBSQ HOSP IP/OBS MODERATE 35: CPT | Performed by: INTERNAL MEDICINE

## 2020-07-24 PROCEDURE — 85025 COMPLETE CBC W/AUTO DIFF WBC: CPT | Performed by: STUDENT IN AN ORGANIZED HEALTH CARE EDUCATION/TRAINING PROGRAM

## 2020-07-24 PROCEDURE — 94762 N-INVAS EAR/PLS OXIMTRY CONT: CPT

## 2020-07-24 PROCEDURE — 84100 ASSAY OF PHOSPHORUS: CPT | Performed by: STUDENT IN AN ORGANIZED HEALTH CARE EDUCATION/TRAINING PROGRAM

## 2020-07-24 PROCEDURE — 90471 IMMUNIZATION ADMIN: CPT | Performed by: STUDENT IN AN ORGANIZED HEALTH CARE EDUCATION/TRAINING PROGRAM

## 2020-07-24 PROCEDURE — 71045 X-RAY EXAM CHEST 1 VIEW: CPT

## 2020-07-24 RX ORDER — POTASSIUM CHLORIDE 20 MEQ/1
20 TABLET, EXTENDED RELEASE ORAL ONCE
Status: COMPLETED | OUTPATIENT
Start: 2020-07-24 | End: 2020-07-24

## 2020-07-24 RX ORDER — ACETAMINOPHEN 325 MG/1
650 TABLET ORAL ONCE
Status: COMPLETED | OUTPATIENT
Start: 2020-07-24 | End: 2020-07-24

## 2020-07-24 RX ORDER — NAPROXEN 500 MG/1
500 TABLET ORAL ONCE
Status: COMPLETED | OUTPATIENT
Start: 2020-07-24 | End: 2020-07-24

## 2020-07-24 RX ORDER — POTASSIUM CHLORIDE 20 MEQ/1
40 TABLET, EXTENDED RELEASE ORAL ONCE
Status: COMPLETED | OUTPATIENT
Start: 2020-07-24 | End: 2020-07-24

## 2020-07-24 RX ADMIN — Medication 100 MG: at 08:54

## 2020-07-24 RX ADMIN — POTASSIUM CHLORIDE 40 MEQ: 1500 TABLET, EXTENDED RELEASE ORAL at 14:09

## 2020-07-24 RX ADMIN — POTASSIUM CHLORIDE 20 MEQ: 1500 TABLET, EXTENDED RELEASE ORAL at 14:09

## 2020-07-24 RX ADMIN — ACETAMINOPHEN 650 MG: 325 TABLET, FILM COATED ORAL at 22:26

## 2020-07-24 RX ADMIN — LAMOTRIGINE 150 MG: 100 TABLET ORAL at 08:53

## 2020-07-24 RX ADMIN — Medication 1 TABLET: at 08:54

## 2020-07-24 RX ADMIN — FOLIC ACID 1 MG: 1 TABLET ORAL at 08:54

## 2020-07-24 RX ADMIN — ENOXAPARIN SODIUM 40 MG: 40 INJECTION SUBCUTANEOUS at 08:53

## 2020-07-24 RX ADMIN — PNEUMOCOCCAL VACCINE POLYVALENT 0.5 ML
25; 25; 25; 25; 25; 25; 25; 25; 25; 25; 25; 25; 25; 25; 25; 25; 25; 25; 25; 25; 25; 25; 25 INJECTION, SOLUTION INTRAMUSCULAR; SUBCUTANEOUS at 15:15

## 2020-07-24 RX ADMIN — NAPROXEN 500 MG: 500 TABLET ORAL at 15:29

## 2020-07-24 RX ADMIN — NICOTINE 7 MG: 7 PATCH TRANSDERMAL at 09:20

## 2020-07-24 NOTE — CONSULTS
Consultation - Jocelyne Weller 49 y o  female MRN: [id number]    Unit/Bed#: ICU 01 Encounter: [id number]      Identifying Data:  49years old white female is admitted at [facility] on July 22, 2020 after she attempted suicide by taking an overdose of Benadryl'18 pills''with alcohol  Patient's alcohol level was 218 and drug screen was negative psychiatric consultation is asked for psychiatric evaluation and recommendation  Patient examined spoke with the nurse and spoke with the  at bedside  Patient is able to tell me that she is stressed out and she took pills with alcohol to kill herself   reports that she is stressed out about their son who is at [facility] in [place] [place] and he does not like it there also patient's father is helping them financially and he is a controlling person he threatened to stop helping financially if her son does not stay at [facility]  Patient became stressed and overwhelmed she relapsed with her bipolar depression and attempted suicide  Patient lives with the  they have 2 sons and 1 daughter patient smokes cigarette and drinks alcohol 3 times a week and she drinks about 1 bottle of wine a day patient has no history of drug abuse patient has college education and she is an  but she is not working since 10 years  Patient is suffering from bipolar disorder since long time history of alcohol abuse and suicide attempt in the past I reviewed her medication list patient is taking Lamictal 150 mg HS Cymbalta 60 mg b i d  And Ativan 1 mg daily p r n  for anxiety patient is allergic to penicillin    Chief Complaints:  Suicide attempt by taking an overdose of Benadryl and alcohol    Family History: History reviewed  No pertinent family history    Patient denies    Legal History:  Patient reports that she was arrested for aggravated assault 12 years ago otherwise she has no legal problems    Mental Status Exam:  49years old white female is resting in the bed alert awake oriented x3 she is not lethargic memory intact she is able to give out the basic background information  Poor sleep appetite okay smith labile impulsive suicidal   Patient denies auditory or visual hallucinations  Patient denies homicidal ideation  Poor concentration no paranoia no delusion elucidated  Insight and judgments are adequate  History of Present Illness     HPI: Mery Morris is a 49y o  year old female who presents with suicide attempt with overdose of Benadryl and alcohol    Consults      Historical Information   Past Psychiatric History:  Patient gives an extensive history of bipolar disorder she has been under psychiatric care and taking psychotropic medications as described above Lamictal Cymbalta and Ativan p r n  She has a psychiatrist   She reports long history and she has tried different psychotropics over the years she gives a history of at least 1 psychiatric admission and 1 suicide attempt in the past she gives a history of alcohol abuse and she still drinks 3 times a week she gives a history of 1 dui 10 years ago and she had been in a 3 rehabs for alcohol abuse  Currently she is still drinking as described above  Patient has no history of drug abuse no IV drug abuse currently she is taking psychotropic medications from her psychiatrist   Past Medical History:   Diagnosis Date    Depression     Psychiatric disorder      History reviewed  No pertinent surgical history    Social History   Social History     Substance and Sexual Activity   Alcohol Use Yes     Social History     Substance and Sexual Activity   Drug Use Not Currently     Social History     Tobacco Use   Smoking Status Current Every Day Smoker    Packs/day: 1 00   Smokeless Tobacco Never Used       Meds/Allergies   current meds:   Current Facility-Administered Medications   Medication Dose Route Frequency    enoxaparin (LOVENOX) subcutaneous injection 40 mg  40 mg Subcutaneous Daily    folic acid (FOLVITE) tablet 1 mg  1 mg Oral Daily    lamoTRIgine (LaMICtal) tablet 150 mg  150 mg Oral Daily    multivitamin-minerals (CENTRUM) tablet 1 tablet  1 tablet Oral Daily    nicotine (NICODERM CQ) 7 mg/24hr TD 24 hr patch 7 mg  7 mg Transdermal Daily    pneumococcal 23-valent polysaccharide vaccine (PNEUMOVAX-23) injection 0 5 mL  0 5 mL Intramuscular Prior to discharge    thiamine (VITAMIN B1) tablet 100 mg  100 mg Oral Daily    and PTA meds:    Medications Prior to Admission   Medication    cetirizine (ZyrTEC) 10 mg tablet    Doxylamine Succinate, Sleep, (UNISOM PO)    DULoxetine (CYMBALTA) 60 mg delayed release capsule    ferrous sulfate 325 (65 Fe) mg tablet    lamoTRIgine (LaMICtal) 150 MG tablet    LORazepam (ATIVAN) 1 mg tablet    senna (SENOKOT) 8 6 MG tablet    SUMAtriptan (IMITREX) 100 mg tablet     Allergies   Allergen Reactions    Penicillins        Objective   Vitals: Blood pressure 145/75, pulse 77, temperature (!) 97 2 °F (36 2 °C), resp  rate 18, height 5' 6" (1 676 m), weight 96 7 kg (213 lb 3 oz), last menstrual period 07/22/2020, SpO2 97 %        Routine Lab Results:   Admission on 07/22/2020   Component Date Value Ref Range Status    WBC 07/22/2020 8 45  4 31 - 10 16 Thousand/uL Final    RBC 07/22/2020 4 87  3 81 - 5 12 Million/uL Final    Hemoglobin 07/22/2020 15 1  11 5 - 15 4 g/dL Final    Hematocrit 07/22/2020 44 6  34 8 - 46 1 % Final    MCV 07/22/2020 92  82 - 98 fL Final    MCH 07/22/2020 31 0  26 8 - 34 3 pg Final    MCHC 07/22/2020 33 9  31 4 - 37 4 g/dL Final    RDW 07/22/2020 14 9  11 6 - 15 1 % Final    MPV 07/22/2020 9 6  8 9 - 12 7 fL Final    Platelets 24/13/1499 268  149 - 390 Thousands/uL Final    Neutrophils Relative 07/22/2020 45  43 - 75 % Final    Lymphocytes Relative 07/22/2020 42  14 - 44 % Final    Monocytes Relative 07/22/2020 8  4 - 12 % Final    Eosinophils Relative 07/22/2020 4  0 - 6 % Final    Basophils Relative 07/22/2020 1  0 - 1 % Final    Neutrophils Absolute 07/22/2020 3 79  1 85 - 7 62 Thousands/µL Final    Lymphocytes Absolute 07/22/2020 3 54  0 60 - 4 47 Thousands/µL Final    Monocytes Absolute 07/22/2020 0 71  0 17 - 1 22 Thousand/µL Final    Eosinophils Absolute 07/22/2020 0 37  0 00 - 0 61 Thousand/µL Final    Basophils Absolute 07/22/2020 0 04  0 00 - 0 10 Thousands/µL Final    Sodium 07/22/2020 141  136 - 145 mmol/L Final    Potassium 07/22/2020 4 3  3 5 - 5 3 mmol/L Final    Chloride 07/22/2020 105  100 - 108 mmol/L Final    CO2 07/22/2020 24  21 - 32 mmol/L Final    ANION GAP 07/22/2020 12  4 - 13 mmol/L Final    BUN 07/22/2020 22  5 - 25 mg/dL Final    Creatinine 07/22/2020 0 96  0 60 - 1 30 mg/dL Final    Standardized to IDMS reference method    Glucose 07/22/2020 83  65 - 140 mg/dL Final      If the patient is fasting, the ADA then defines impaired fasting glucose as > 100 mg/dL and diabetes as > or equal to 123 mg/dL  Specimen collection should occur prior to Sulfasalazine administration due to the potential for falsely depressed results  Specimen collection should occur prior to Sulfapyridine administration due to the potential for falsely elevated results   Calcium 07/22/2020 9 0  8 3 - 10 1 mg/dL Final    AST 07/22/2020 32  5 - 45 U/L Final      Specimen collection should occur prior to Sulfasalazine administration due to the potential for falsely depressed results   ALT 07/22/2020 39  12 - 78 U/L Final      Specimen collection should occur prior to Sulfasalazine administration due to the potential for falsely depressed results       Alkaline Phosphatase 07/22/2020 94  46 - 116 U/L Final    Total Protein 07/22/2020 6 7  6 4 - 8 2 g/dL Final    Albumin 07/22/2020 3 9  3 5 - 5 0 g/dL Final    Total Bilirubin 07/22/2020 0 20  0 20 - 1 00 mg/dL Final      Use of this assay is not recommended for patients undergoing treatment with eltrombopag due to the potential for falsely elevated results   eGFR 07/22/2020 68  ml/min/1 73sq m Final    Magnesium 07/22/2020 2 4  1 6 - 2 6 mg/dL Final    Amph/Meth UR 07/22/2020 Negative  Negative Final    Barbiturate Ur 07/22/2020 Negative  Negative Final    Benzodiazepine Urine 07/22/2020 Negative  Negative Final    Cocaine Urine 07/22/2020 Negative  Negative Final    Methadone Urine 07/22/2020 Negative  Negative Final    Opiate Urine 07/22/2020 Negative  Negative Final    PCP Ur 07/22/2020 Negative  Negative Final    THC Urine 07/22/2020 Negative  Negative Final    Oxycodone Urine 07/22/2020 Negative  Negative Final    Troponin I 07/22/2020 <0 02  <=0 04 ng/mL Final    Autovalidation override  [employer] Chemistry analyzer 99% cutoff is > 0 04 ng/mL in network labs     o cTnI 99% cutoff is useful only when applied to patients in the clinical setting of myocardial ischemia   o cTnI 99% cutoff should be interpreted in the context of clinical history, ECG findings and possibly cardiac imaging to establish correct diagnosis  o cTnI 99% cutoff may be suggestive but clearly not indicative of a coronary event without the clinical setting of myocardial ischemia        Ethanol Lvl 07/22/2020 218* 0 - 3 mg/dL Final    Salicylate Lvl 03/57/6094 3 2  3 - 20 mg/dL Final    Acetaminophen Level 07/22/2020 <2 0* 10 - 20 ug/mL Final    SARS-CoV-2 07/22/2020 Negative  Negative Final    pH, Arterial 07/23/2020 7 191* 7 350 - 7 450 Final    PH ART TC 07/23/2020 7 188* 7 350 - 7 450 Final    pCO2, Arterial 07/23/2020 53 4* 36 0 - 44 0 mm Hg Final    PCO2 (TC) Arterial 07/23/2020 53 9* 36 0 - 44 0 mm Hg Final    pO2, Arterial 07/23/2020 83 7  75 0 - 129 0 mm Hg Final    PO2 (TC) Arterial 07/23/2020 84 8  75 0 - 129 0 mm Hg Final    HCO3, Arterial 07/23/2020 20 0* 22 0 - 28 0 mmol/L Final    Base Excess, Arterial 07/23/2020 -8 6  mmol/L Final    O2 Content, Arterial 07/23/2020 18 3  16 0 - 23 0 mL/dL Final    O2 HGB,Arterial  07/23/2020 90 3* 94 0 - 97 0 % Final    SOURCE 07/23/2020 Radial, Left   Final    ARACELY TEST 07/23/2020 Yes   Final    Temperature 07/23/2020 99 0  Degrees Fehrenheit Final    Vent Type- AC 07/23/2020 AC   Final    AC Rate 07/23/2020 16   Final    Tidal Volume 07/23/2020 450  ml Final    Inspired Air (FIO2) 07/23/2020 80   Final    PEEP 07/23/2020 8   Final    WBC 07/23/2020 8 73  4 31 - 10 16 Thousand/uL Final    RBC 07/23/2020 4 40  3 81 - 5 12 Million/uL Final    Hemoglobin 07/23/2020 13 7  11 5 - 15 4 g/dL Final    Hematocrit 07/23/2020 40 5  34 8 - 46 1 % Final    MCV 07/23/2020 92  82 - 98 fL Final    MCH 07/23/2020 31 1  26 8 - 34 3 pg Final    MCHC 07/23/2020 33 8  31 4 - 37 4 g/dL Final    RDW 07/23/2020 14 6  11 6 - 15 1 % Final    MPV 07/23/2020 9 6  8 9 - 12 7 fL Final    Platelets 83/59/2058 225  149 - 390 Thousands/uL Final    nRBC 07/23/2020 0  /100 WBCs Final    Neutrophils Relative 07/23/2020 56  43 - 75 % Final    Immat GRANS % 07/23/2020 0  0 - 2 % Final    Lymphocytes Relative 07/23/2020 34  14 - 44 % Final    Monocytes Relative 07/23/2020 7  4 - 12 % Final    Eosinophils Relative 07/23/2020 2  0 - 6 % Final    Basophils Relative 07/23/2020 1  0 - 1 % Final    Neutrophils Absolute 07/23/2020 4 90  1 85 - 7 62 Thousands/µL Final    Immature Grans Absolute 07/23/2020 0 03  0 00 - 0 20 Thousand/uL Final    Lymphocytes Absolute 07/23/2020 2 93  0 60 - 4 47 Thousands/µL Final    Monocytes Absolute 07/23/2020 0 65  0 17 - 1 22 Thousand/µL Final    Eosinophils Absolute 07/23/2020 0 16  0 00 - 0 61 Thousand/µL Final    Basophils Absolute 07/23/2020 0 06  0 00 - 0 10 Thousands/µL Final    Sodium 07/23/2020 139  136 - 145 mmol/L Final    Potassium 07/23/2020 3 9  3 5 - 5 3 mmol/L Final    Chloride 07/23/2020 106  100 - 108 mmol/L Final    CO2 07/23/2020 21  21 - 32 mmol/L Final    ANION GAP 07/23/2020 12  4 - 13 mmol/L Final    BUN 07/23/2020 20  5 - 25 mg/dL Final    Creatinine 07/23/2020 0 70  0 60 - 1 30 mg/dL Final    Standardized to IDMS reference method    Glucose 07/23/2020 105  65 - 140 mg/dL Final      If the patient is fasting, the ADA then defines impaired fasting glucose as > 100 mg/dL and diabetes as > or equal to 123 mg/dL  Specimen collection should occur prior to Sulfasalazine administration due to the potential for falsely depressed results  Specimen collection should occur prior to Sulfapyridine administration due to the potential for falsely elevated results      Calcium 07/23/2020 7 0* 8 3 - 10 1 mg/dL Final    eGFR 07/23/2020 99  ml/min/1 73sq m Final    Magnesium 07/23/2020 1 9  1 6 - 2 6 mg/dL Final    Phosphorus 07/23/2020 2 9  2 7 - 4 5 mg/dL Final    pH, Arterial 07/23/2020 7 420  7 350 - 7 450 Final    PH ART TC 07/23/2020 7 417  7 350 - 7 450 Final    pCO2, Arterial 07/23/2020 30 3* 36 0 - 44 0 mm Hg Final    PCO2 (TC) Arterial 07/23/2020 30 6* 36 0 - 44 0 mm Hg Final    pO2, Arterial 07/23/2020 83 1  75 0 - 129 0 mm Hg Final    PO2 (TC) Arterial 07/23/2020 84 2  75 0 - 129 0 mm Hg Final    HCO3, Arterial 07/23/2020 19 2* 22 0 - 28 0 mmol/L Final    Base Excess, Arterial 07/23/2020 -4 0  mmol/L Final    O2 Content, Arterial 07/23/2020 18 9  16 0 - 23 0 mL/dL Final    O2 HGB,Arterial  07/23/2020 94 6  94 0 - 97 0 % Final    SOURCE 07/23/2020 Radial, Left   Final    Temperature 07/23/2020 99 0  Degrees Fehrenheit Final    Vent Type- AC 07/23/2020 AC   Final    AC Rate 07/23/2020 22   Final    Tidal Volume 07/23/2020 450  ml Final    Inspired Air (FIO2) 07/23/2020 30   Final    PEEP 07/23/2020 5   Final    Ventricular Rate 07/22/2020 106  BPM Final    Atrial Rate 07/22/2020 106  BPM Final    MA Interval 07/22/2020 154  ms Final    QRSD Interval 07/22/2020 142  ms Final    QT Interval 07/22/2020 384  ms Final    QTC Interval 07/22/2020 510  ms Final    P Axis 07/22/2020 44  degrees Final    QRS Axis 07/22/2020 27  degrees Final    T Wave Hamilton 07/22/2020 111  degrees Final    Troponin I 07/23/2020 <0 02  <=0 04 ng/mL Final    Autovalidation override  [employer] Chemistry analyzer 99% cutoff is > 0 04 ng/mL in network labs     o cTnI 99% cutoff is useful only when applied to patients in the clinical setting of myocardial ischemia   o cTnI 99% cutoff should be interpreted in the context of clinical history, ECG findings and possibly cardiac imaging to establish correct diagnosis  o cTnI 99% cutoff may be suggestive but clearly not indicative of a coronary event without the clinical setting of myocardial ischemia        WBC 07/24/2020 11 34* 4 31 - 10 16 Thousand/uL Final    RBC 07/24/2020 4 38  3 81 - 5 12 Million/uL Final    Hemoglobin 07/24/2020 13 4  11 5 - 15 4 g/dL Final    Hematocrit 07/24/2020 40 9  34 8 - 46 1 % Final    MCV 07/24/2020 93  82 - 98 fL Final    MCH 07/24/2020 30 6  26 8 - 34 3 pg Final    MCHC 07/24/2020 32 8  31 4 - 37 4 g/dL Final    RDW 07/24/2020 14 9  11 6 - 15 1 % Final    MPV 07/24/2020 9 5  8 9 - 12 7 fL Final    Platelets 65/33/8959 189  149 - 390 Thousands/uL Final    Neutrophils Relative 07/24/2020 66  43 - 75 % Final    Lymphocytes Relative 07/24/2020 22  14 - 44 % Final    Monocytes Relative 07/24/2020 8  4 - 12 % Final    Eosinophils Relative 07/24/2020 3  0 - 6 % Final    Basophils Relative 07/24/2020 1  0 - 1 % Final    Neutrophils Absolute 07/24/2020 7 50  1 85 - 7 62 Thousands/µL Final    Lymphocytes Absolute 07/24/2020 2 48  0 60 - 4 47 Thousands/µL Final    Monocytes Absolute 07/24/2020 0 91  0 17 - 1 22 Thousand/µL Final    Eosinophils Absolute 07/24/2020 0 39  0 00 - 0 61 Thousand/µL Final    Basophils Absolute 07/24/2020 0 06  0 00 - 0 10 Thousands/µL Final    Sodium 07/24/2020 133* 136 - 145 mmol/L Final    Potassium 07/24/2020 3 5  3 5 - 5 3 mmol/L Final    Chloride 07/24/2020 101  100 - 108 mmol/L Final    CO2 07/24/2020 26  21 - 32 mmol/L Final    ANION GAP 07/24/2020 6  4 - 13 mmol/L Final    BUN 07/24/2020 9  5 - 25 mg/dL Final    Creatinine 07/24/2020 0 87  0 60 - 1 30 mg/dL Final    Standardized to IDMS reference method    Glucose 07/24/2020 98  65 - 140 mg/dL Final      If the patient is fasting, the ADA then defines impaired fasting glucose as > 100 mg/dL and diabetes as > or equal to 123 mg/dL  Specimen collection should occur prior to Sulfasalazine administration due to the potential for falsely depressed results  Specimen collection should occur prior to Sulfapyridine administration due to the potential for falsely elevated results   Calcium 07/24/2020 7 2* 8 3 - 10 1 mg/dL Final    AST 07/24/2020 23  5 - 45 U/L Final      Specimen collection should occur prior to Sulfasalazine administration due to the potential for falsely depressed results   ALT 07/24/2020 22  12 - 78 U/L Final      Specimen collection should occur prior to Sulfasalazine administration due to the potential for falsely depressed results   Alkaline Phosphatase 07/24/2020 91  46 - 116 U/L Final    Total Protein 07/24/2020 5 4* 6 4 - 8 2 g/dL Final    Albumin 07/24/2020 2 9* 3 5 - 5 0 g/dL Final    Total Bilirubin 07/24/2020 0 70  0 20 - 1 00 mg/dL Final      Use of this assay is not recommended for patients undergoing treatment with eltrombopag due to the potential for falsely elevated results   eGFR 07/24/2020 76  ml/min/1 73sq m Final    Magnesium 07/24/2020 2 5  1 6 - 2 6 mg/dL Final    Phosphorus 07/24/2020 2 2* 2 7 - 4 5 mg/dL Final         Diagnosis:  Bipolar disorder mixed type  Insomnia  Alcohol abuse  Family issue   Plan:  Continue one-to-one suicidal precaution  Transfer to inpatient psychiatric care for further treatment and stabilization  I discussed with the patient and the  at bedside  Medical treatment with overdose is ongoing I will not start her Cymbalta and Ativan p r n  At this time  Patient is already on Lamictal 150 mg daily  Psychotherapy    Patient will need psychiatric follow-up with a psychiatrist and possibly she will benefit from seeing a psychotherapist to deal with her ongoing stressors in the family and deal with alcohol abuse  I will follow up during the hospital stay      Cindy Neil MD

## 2020-07-24 NOTE — PROGRESS NOTES
Progress Note - Tiara Love 1965, 49 y o  female MRN: [id number]    Unit/Bed#: ICU 01 Encounter: [id number]    Primary Care Provider: No primary care provider on file  Date and time admitted to hospital: 7/22/2020  9:50 PM        ETOH abuse  Assessment & Plan  Per  patient is intermittently binge drinking, mainly wine but sometimes drinks hard liquor    Recently has been drinking for the last several days   No history of etoh withdrawal   CIWA protocol    Suicide attempt [facility])  Assessment & Plan  H/o suicide attempt approximately 10 years ago with etoh as well as tylenol   Required psych admission after being medically treated  Per  patients father had been putting pressure on family by threatening to withhold financial support unless they live up to expectations  Son at [facility] point had a mental break down over this last week causing him to remove himself from the program  This is what sparked the patients recent etoh binge ending with a call to the patients father stating "im going to kill myself", he then sent a text to the  who found her in her room with an empty benadryl container and a bottle of wine  Pill count ingested approximately 15-20 tabs of 25mg benadryl   Etoh 218 on arrival  pysch consult    Bipolar depression ([facility] [address] )  Assessment & Plan  - Takes cymbalta at home, currently on hold  - Take Lamictal 150mg at home, continue    * Anticholinergic drug overdose  Assessment & Plan  Attempted suicide after drinking a bottle of wine and ingesting at least 15 tabs of benadryl 25 mg     Brought in by private vehicle by    Agitated/altered on arrival   UDS negative   Tylenol level <2   Given 2 mg Ativan on arrival and intubated for airway protection shortly after   Extubated 7/23 and doing well on NC    Elevated blood pressure reading  Assessment & Plan  · Secondary to anticholinergic overdose, resolved and off of Nicardipine        ----------------------------------------------------------------------------------------  HPI/24hr events: extubated , doing well    Disposition: Transfer to Med Surg with Telemetry   Code Status: Level 1 - Full Code  ---------------------------------------------------------------------------------------  SUBJECTIVE  No complaints    Review of Systems  Review of systems was reviewed and negative unless stated above in HPI/24-hour events   ---------------------------------------------------------------------------------------  OBJECTIVE    Vitals   Vitals:    20 0100 20 0200 20 0300 20 0400   BP: 163/81 154/90 158/90 156/90   Pulse: 86 84 80 81   Resp: 22 (!)  21    Temp:       TempSrc:       SpO2: 99% 99% 99% 99%   Weight:       Height:         Temp (24hrs), Av 3 °F (36 8 °C), Min:97 6 °F (36 4 °C), Max:99 3 °F (37 4 °C)  Current: Temperature: 98 7 °F (37 1 °C)          Respiratory:  SpO2: SpO2: 99 %, SpO2 Activity: SpO2 Activity: At Rest, SpO2 Device: O2 Device: Nasal cannula, Capnography:    Nasal Cannula O2 Flow Rate (L/min): 2 L/min    Invasive/non-invasive ventilation settings   Respiratory    Lab Data (Last 4 hours)    None         O2/Vent Data (Last 4 hours)    None                Physical Exam   Constitutional: She is oriented to person, place, and time  She appears well-developed and well-nourished  No distress  HENT:   Head: Normocephalic and atraumatic  Eyes: Pupils are equal, round, and reactive to light  EOM are normal    Neck: Normal range of motion  Cardiovascular: Normal rate and regular rhythm  No murmur heard  Pulmonary/Chest: Effort normal and breath sounds normal  No respiratory distress  Abdominal: Soft  Bowel sounds are normal  She exhibits no distension  Musculoskeletal: She exhibits no edema  Neurological: She is alert and oriented to person, place, and time  Skin: Skin is warm and dry     Nursing note and vitals reviewed  Laboratory and Diagnostics:  Results from last 7 days   Lab Units 07/23/20  0435 07/22/20  2211   WBC Thousand/uL 8 73 8 45   HEMOGLOBIN g/dL 13 7 15 1   HEMATOCRIT % 40 5 44 6   PLATELETS Thousands/uL 225 268   NEUTROS PCT % 56 45   MONOS PCT % 7 8     Results from last 7 days   Lab Units 07/23/20  0435 07/22/20  2211   SODIUM mmol/L 139 141   POTASSIUM mmol/L 3 9 4 3   CHLORIDE mmol/L 106 105   CO2 mmol/L 21 24   ANION GAP mmol/L 12 12   BUN mg/dL 20 22   CREATININE mg/dL 0 70 0 96   CALCIUM mg/dL 7 0* 9 0   GLUCOSE RANDOM mg/dL 105 83   ALT U/L  --  39   AST U/L  --  32   ALK PHOS U/L  --  94   ALBUMIN g/dL  --  3 9   TOTAL BILIRUBIN mg/dL  --  0 20     Results from last 7 days   Lab Units 07/23/20  0435 07/22/20  2211   MAGNESIUM mg/dL 1 9 2 4   PHOSPHORUS mg/dL 2 9  --            Results from last 7 days   Lab Units 07/23/20  1409 07/22/20  2211   TROPONIN I ng/mL <0 02 <0 02         ABG:  Results from last 7 days   Lab Units 07/23/20  0728   PH ART  7 420   PCO2 ART mm Hg 30 3*   PO2 ART mm Hg 83 1   HCO3 ART mmol/L 19 2*   BASE EXC ART mmol/L -4 0   ABG SOURCE  Radial, Left     VBG:  Results from last 7 days   Lab Units 07/23/20  0728   ABG SOURCE  Radial, Left           Micro        EKG:   Imaging: I have personally reviewed pertinent reports  Intake and Output  I/O       07/22 0701 - 07/23 0700 07/23 0701 - 07/24 0700    P  O   237    I V  (mL/kg)  2591 8 (26 8)    NG/GT  30    IV Piggyback  50    Total Intake(mL/kg)  2908 8 (30 1)    Urine (mL/kg/hr) 1425 3675 (1 6)    Emesis/NG output  600    Total Output 1425 4275    Net -1425 -1366 2                Height and Weights   Height: 5' 6" (167 6 cm)  IBW: 59 3 kg  Body mass index is 34 37 kg/m²    Weight (last 2 days)     Date/Time   Weight    07/23/20 0600   96 6 (212 96)    07/23/20 0028   96 6 (212 96)    07/22/20 2229   98 9 (218 03)                Nutrition       Diet Orders   (From admission, onward)             Start     Ordered 07/23/20 2104  Diet Clear Liquid  Diet effective now     Question Answer Comment   Diet Type Clear Liquid    RD to adjust diet per protocol? Yes        07/23/20 2103 07/23/20 0906  Room Service  Once     Question:  Type of Service  Answer:  Room Service - Appropriate with Assistance    07/23/20 0905                  Active Medications  Scheduled Meds:  Current Facility-Administered Medications:  chlorhexidine 15 mL Swish & Spit Q12H [facility] Molly Guerrero PA-C   enoxaparin 40 mg Subcutaneous Daily Molly Guerrero PA-C   folic acid 1 mg Oral Daily Molly Guerrero PA-C   lamoTRIgine 150 mg Oral Daily Bobby Summers   multivitamin-minerals 1 tablet Oral Daily Molly Guerrero PA-C   pneumococcal 23-valent polysaccharide vaccine 0 5 mL Intramuscular Prior to discharge Zulema Whitlock MD   thiamine 100 mg Oral Daily Molly Guerrero PA-C     Continuous Infusions:     PRN Meds:     pneumococcal 23-valent polysaccharide vaccine 0 5 mL Prior to discharge       Invasive Devices Review  Invasive Devices     Peripheral Intravenous Line            Peripheral IV 07/22/20 Right Forearm 1 day    Peripheral IV 07/22/20 Right Wrist 1 day    Peripheral IV 07/23/20 Dorsal (posterior); Left Hand less than 1 day          Drain            Urethral Catheter Straight-tip 16 Fr  1 day                Rationale for remaining devices:   ---------------------------------------------------------------------------------------  Advance Directive and Living Will:      Power of :    POLST:    ---------------------------------------------------------------------------------------  Care Time Delivered:   No Critical Care time spent       [facility] JONO Peacock      Portions of the record may have been created with voice recognition software  Occasional wrong word or "sound a like" substitutions may have occurred due to the inherent limitations of voice recognition software    Read the chart carefully and recognize, using context, where substitutions have occurred

## 2020-07-24 NOTE — PROGRESS NOTES
Received call from [employer], updated of patient status and recent labs, Josh Rogers said since patient is getting better case will be closed on their end

## 2020-07-24 NOTE — PROGRESS NOTES
Patient transferred to N via wheelchair accompanied by CNA [facility], patient awake alert pleasant to staff, denies pain and discomfort, calm, cooperative  Report given to 4N nurse Kimberly Dodson, patient endorsed  Left message to ,s mobile phone informing of transfer

## 2020-07-24 NOTE — PLAN OF CARE
Problem: Potential for Falls  Goal: Patient will remain free of falls  Description  INTERVENTIONS:  - Assess patient frequently for physical needs  -  Identify cognitive and physical deficits and behaviors that affect risk of falls  -  [place] fall precautions as indicated by assessment   - Educate patient/family on patient safety including physical limitations  - Instruct patient to call for assistance with activity based on assessment  - Modify environment to reduce risk of injury  - Consider OT/PT consult to assist with strengthening/mobility  Outcome: Progressing     Problem: RESPIRATORY - ADULT  Goal: Achieves optimal ventilation and oxygenation  Description  INTERVENTIONS:  - Assess for changes in respiratory status  - Assess for changes in mentation and behavior  - Position to facilitate oxygenation and minimize respiratory effort  - Oxygen administered by appropriate delivery if ordered  - Initiate smoking cessation education as indicated  - Encourage broncho-pulmonary hygiene including cough, deep breathe, Incentive Spirometry  - Assess the need for suctioning and aspirate as needed  - Assess and instruct to report SOB or any respiratory difficulty  - Respiratory Therapy support as indicated  Outcome: Progressing     Problem: Nutrition/Hydration-ADULT  Goal: Nutrient/Hydration intake appropriate for improving, restoring or maintaining nutritional needs  Description  Monitor and assess patient's nutrition/hydration status for malnutrition  Collaborate with interdisciplinary team and initiate plan and interventions as ordered  Monitor patient's weight and dietary intake as ordered or per policy  Utilize nutrition screening tool and intervene as necessary  Determine patient's food preferences and provide high-protein, high-caloric foods as appropriate       INTERVENTIONS:  - Monitor oral intake, urinary output, labs, and treatment plans  - Assess nutrition and hydration status and recommend course of action  - Evaluate amount of meals eaten  - Assist patient with eating if necessary   - Allow adequate time for meals  - Recommend/ encourage appropriate diets, oral nutritional supplements, and vitamin/mineral supplements  - Order, calculate, and assess calorie counts as needed  - Recommend, monitor, and adjust tube feedings and TPN/PPN based on assessed needs  - Assess need for intravenous fluids  - Provide specific nutrition/hydration education as appropriate  - Include patient/family/caregiver in decisions related to nutrition  Outcome: Progressing     Problem: Prexisting or High Potential for Compromised Skin Integrity  Goal: Skin integrity is maintained or improved  Description  INTERVENTIONS:  - Identify patients at risk for skin breakdown  - Assess and monitor skin integrity  - Assess and monitor nutrition and hydration status  - Monitor labs   - Assess for incontinence   - Turn and reposition patient  - Assist with mobility/ambulation  - Relieve pressure over bony prominences  - Avoid friction and shearing  - Provide appropriate hygiene as needed including keeping skin clean and dry  - Evaluate need for skin moisturizer/barrier cream  - Collaborate with interdisciplinary team   - Patient/family teaching  - Consider wound care consult   Outcome: Progressing     Problem: SAFETY,RESTRAINT: NV/NON-SELF DESTRUCTIVE BEHAVIOR  Goal: Remains free of harm/injury (restraint for non violent/non self-detsructive behavior)  Description  INTERVENTIONS:  - Instruct patient/family regarding restraint use   - Assess and monitor physiologic and psychological status   - Provide interventions and comfort measures to meet assessed patient needs   - Identify and implement measures to help patient regain control  - Assess readiness for release of restraint   Outcome: Progressing  Goal: Returns to optimal restraint-free functioning  Description  INTERVENTIONS:  - Assess the patient's behavior and symptoms that indicate continued need for restraint  - Identify and implement measures to help patient regain control  - Assess readiness for release of restraint   Outcome: Progressing     Problem: SAFETY ADULT  Goal: Patient will remain free of falls  Description  INTERVENTIONS:  - Assess patient frequently for physical needs  -  Identify cognitive and physical deficits and behaviors that affect risk of falls    -  [place] fall precautions as indicated by assessment   - Educate patient/family on patient safety including physical limitations  - Instruct patient to call for assistance with activity based on assessment  - Modify environment to reduce risk of injury  - Consider OT/PT consult to assist with strengthening/mobility  Outcome: Progressing  Goal: Maintain or return to baseline ADL function  Description  INTERVENTIONS:  -  Assess patient's ability to carry out ADLs; assess patient's baseline for ADL function and identify physical deficits which impact ability to perform ADLs (bathing, care of mouth/teeth, toileting, grooming, dressing, etc )  - Assess/evaluate cause of self-care deficits   - Assess range of motion  - Assess patient's mobility; develop plan if impaired  - Assess patient's need for assistive devices and provide as appropriate  - Encourage maximum independence but intervene and supervise when necessary  - Involve family in performance of ADLs  - Assess for home care needs following discharge   - Consider OT consult to assist with ADL evaluation and planning for discharge  - Provide patient education as appropriate  Outcome: Progressing  Goal: Maintain or return mobility status to optimal level  Description  INTERVENTIONS:  - Assess patient's baseline mobility status (ambulation, transfers, stairs, etc )    - Identify cognitive and physical deficits and behaviors that affect mobility  - Identify mobility aids required to assist with transfers and/or ambulation (gait belt, sit-to-stand, lift, walker, cane, etc )  - [place] fall precautions as indicated by assessment  - Record patient progress and toleration of activity level on Mobility SBAR; progress patient to next Phase/Stage  - Instruct patient to call for assistance with activity based on assessment  - Consider rehabilitation consult to assist with strengthening/weightbearing, etc   Outcome: Progressing     Problem: DISCHARGE PLANNING  Goal: Discharge to home or other facility with appropriate resources  Description  INTERVENTIONS:  - Identify barriers to discharge w/patient and caregiver  - Arrange for needed discharge resources and transportation as appropriate  - Identify discharge learning needs (meds, wound care, etc )  - Arrange for interpretive services to assist at discharge as needed  - Refer to Case Management Department for coordinating discharge planning if the patient needs post-hospital services based on physician/advanced practitioner order or complex needs related to functional status, cognitive ability, or social support system  Outcome: Progressing     Problem: Knowledge Deficit  Goal: Patient/family/caregiver demonstrates understanding of disease process, treatment plan, medications, and discharge instructions  Description  Complete learning assessment and assess knowledge base    Interventions:  - Provide teaching at level of understanding  - Provide teaching via preferred learning methods  Outcome: Progressing     Problem: CARDIOVASCULAR - ADULT  Goal: Maintains optimal cardiac output and hemodynamic stability  Description  INTERVENTIONS:  - Monitor I/O, vital signs and rhythm  - Monitor for S/S and trends of decreased cardiac output  - Administer and titrate ordered vasoactive medications to optimize hemodynamic stability  - Assess quality of pulses, skin color and temperature  - Assess for signs of decreased coronary artery perfusion  - Instruct patient to report change in severity of symptoms  Outcome: Progressing  Goal: Absence of cardiac dysrhythmias or at baseline rhythm  Description  INTERVENTIONS:  - Continuous cardiac monitoring, vital signs, obtain 12 lead EKG if ordered  - Administer antiarrhythmic and heart rate control medications as ordered  - Monitor electrolytes and administer replacement therapy as ordered  Outcome: Progressing     Problem: GENITOURINARY - ADULT  Goal: Maintains or returns to baseline urinary function  Description  INTERVENTIONS:  - Assess urinary function  - Encourage oral fluids to ensure adequate hydration if ordered  - Administer IV fluids as ordered to ensure adequate hydration  - Administer ordered medications as needed  - Offer frequent toileting  - Follow urinary retention protocol if ordered  Outcome: Progressing  Goal: Urinary catheter remains patent  Description  INTERVENTIONS:  - Assess patency of urinary catheter  - If patient has a chronic post, consider changing catheter if non-functioning  - Follow guidelines for intermittent irrigation of non-functioning urinary catheter  Outcome: Progressing     Problem: METABOLIC, FLUID AND ELECTROLYTES - ADULT  Goal: Electrolytes maintained within normal limits  Description  INTERVENTIONS:  - Monitor labs and assess patient for signs and symptoms of electrolyte imbalances  - Administer electrolyte replacement as ordered  - Monitor response to electrolyte replacements, including repeat lab results as appropriate  - Instruct patient on fluid and nutrition as appropriate  Outcome: Progressing  Goal: Fluid balance maintained  Description  INTERVENTIONS:  - Monitor labs   - Monitor I/O and WT  - Instruct patient on fluid and nutrition as appropriate  - Assess for signs & symptoms of volume excess or deficit  Outcome: Progressing  Goal: Glucose maintained within target range  Description  INTERVENTIONS:  - Monitor Blood Glucose as ordered  - Assess for signs and symptoms of hyperglycemia and hypoglycemia  - Administer ordered medications to maintain glucose within target range  - Assess nutritional intake and initiate nutrition service referral as needed  Outcome: Progressing     Problem: SKIN/TISSUE INTEGRITY - ADULT  Goal: Skin integrity remains intact  Description  INTERVENTIONS  - Identify patients at risk for skin breakdown  - Assess and monitor skin integrity  - Assess and monitor nutrition and hydration status  - Monitor labs (i e  albumin)  - Assess for incontinence   - Turn and reposition patient  - Assist with mobility/ambulation  - Relieve pressure over bony prominences  - Avoid friction and shearing  - Provide appropriate hygiene as needed including keeping skin clean and dry  - Evaluate need for skin moisturizer/barrier cream  - Collaborate with interdisciplinary team (i e  Nutrition, Rehabilitation, etc )   - Patient/family teaching  Outcome: Progressing     Problem: MUSCULOSKELETAL - ADULT  Goal: Maintain or return mobility to safest level of function  Description  INTERVENTIONS:  - Assess patient's ability to carry out ADLs; assess patient's baseline for ADL function and identify physical deficits which impact ability to perform ADLs (bathing, care of mouth/teeth, toileting, grooming, dressing, etc )  - Assess/evaluate cause of self-care deficits   - Assess range of motion  - Assess patient's mobility  - Assess patient's need for assistive devices and provide as appropriate  - Encourage maximum independence but intervene and supervise when necessary  - Involve family in performance of ADLs  - Assess for home care needs following discharge   - Consider OT consult to assist with ADL evaluation and planning for discharge  - Provide patient education as appropriate  Outcome: Progressing  Goal: Maintain proper alignment of affected body part  Description  INTERVENTIONS:  - Support, maintain and protect limb and body alignment  - Provide patient/ family with appropriate education  Outcome: Progressing   Care plan ongioing

## 2020-07-24 NOTE — QUICK NOTE
Patient's  updated in person  --  Ching Hanley, DO    "This note has been constructed using a voice recognition system  Therefore there may be syntax, spelling, and/or grammatical errors  Please call if you have any questions   All questions and concerns were addressed and answered by myself "

## 2020-07-25 PROBLEM — R33.8 ACUTE URINARY RETENTION: Status: ACTIVE | Noted: 2020-07-25

## 2020-07-25 PROBLEM — J96.01 ACUTE RESPIRATORY FAILURE WITH HYPOXIA (HCC): Status: ACTIVE | Noted: 2020-07-25

## 2020-07-25 LAB
ALBUMIN SERPL BCP-MCNC: 3.2 G/DL (ref 3.5–5)
ALP SERPL-CCNC: 116 U/L (ref 46–116)
ALT SERPL W P-5'-P-CCNC: 24 U/L (ref 12–78)
ANION GAP SERPL CALCULATED.3IONS-SCNC: 9 MMOL/L (ref 4–13)
AST SERPL W P-5'-P-CCNC: 25 U/L (ref 5–45)
BASOPHILS # BLD AUTO: 0.04 THOUSANDS/ΜL (ref 0–0.1)
BASOPHILS NFR BLD AUTO: 0 % (ref 0–1)
BILIRUB DIRECT SERPL-MCNC: 0.17 MG/DL (ref 0–0.2)
BILIRUB SERPL-MCNC: 0.5 MG/DL (ref 0.2–1)
BUN SERPL-MCNC: 11 MG/DL (ref 5–25)
CALCIUM SERPL-MCNC: 8.3 MG/DL (ref 8.3–10.1)
CHLORIDE SERPL-SCNC: 103 MMOL/L (ref 100–108)
CO2 SERPL-SCNC: 24 MMOL/L (ref 21–32)
CREAT SERPL-MCNC: 0.73 MG/DL (ref 0.6–1.3)
EOSINOPHIL # BLD AUTO: 0.63 THOUSAND/ΜL (ref 0–0.61)
EOSINOPHIL NFR BLD AUTO: 7 % (ref 0–6)
ERYTHROCYTE [DISTWIDTH] IN BLOOD BY AUTOMATED COUNT: 14.2 % (ref 11.6–15.1)
GFR SERPL CREATININE-BSD FRML MDRD: 94 ML/MIN/1.73SQ M
GLUCOSE SERPL-MCNC: 79 MG/DL (ref 65–140)
HCT VFR BLD AUTO: 43.4 % (ref 34.8–46.1)
HGB BLD-MCNC: 14.4 G/DL (ref 11.5–15.4)
IMM GRANULOCYTES # BLD AUTO: 0.04 THOUSAND/UL (ref 0–0.2)
IMM GRANULOCYTES NFR BLD AUTO: 0 % (ref 0–2)
LYMPHOCYTES # BLD AUTO: 2.27 THOUSANDS/ΜL (ref 0.6–4.47)
LYMPHOCYTES NFR BLD AUTO: 25 % (ref 14–44)
MCH RBC QN AUTO: 30.9 PG (ref 26.8–34.3)
MCHC RBC AUTO-ENTMCNC: 33.2 G/DL (ref 31.4–37.4)
MCV RBC AUTO: 93 FL (ref 82–98)
MONOCYTES # BLD AUTO: 0.63 THOUSAND/ΜL (ref 0.17–1.22)
MONOCYTES NFR BLD AUTO: 7 % (ref 4–12)
NEUTROPHILS # BLD AUTO: 5.57 THOUSANDS/ΜL (ref 1.85–7.62)
NEUTS SEG NFR BLD AUTO: 61 % (ref 43–75)
NRBC BLD AUTO-RTO: 0 /100 WBCS
PLATELET # BLD AUTO: 213 THOUSANDS/UL (ref 149–390)
PMV BLD AUTO: 10.4 FL (ref 8.9–12.7)
POTASSIUM SERPL-SCNC: 3.9 MMOL/L (ref 3.5–5.3)
PROT SERPL-MCNC: 6.3 G/DL (ref 6.4–8.2)
RBC # BLD AUTO: 4.66 MILLION/UL (ref 3.81–5.12)
SODIUM SERPL-SCNC: 136 MMOL/L (ref 136–145)
WBC # BLD AUTO: 9.18 THOUSAND/UL (ref 4.31–10.16)

## 2020-07-25 PROCEDURE — 80048 BASIC METABOLIC PNL TOTAL CA: CPT | Performed by: STUDENT IN AN ORGANIZED HEALTH CARE EDUCATION/TRAINING PROGRAM

## 2020-07-25 PROCEDURE — 85025 COMPLETE CBC W/AUTO DIFF WBC: CPT | Performed by: STUDENT IN AN ORGANIZED HEALTH CARE EDUCATION/TRAINING PROGRAM

## 2020-07-25 PROCEDURE — 99232 SBSQ HOSP IP/OBS MODERATE 35: CPT | Performed by: NURSE PRACTITIONER

## 2020-07-25 PROCEDURE — 80076 HEPATIC FUNCTION PANEL: CPT | Performed by: STUDENT IN AN ORGANIZED HEALTH CARE EDUCATION/TRAINING PROGRAM

## 2020-07-25 RX ORDER — TAMSULOSIN HYDROCHLORIDE 0.4 MG/1
0.4 CAPSULE ORAL
Status: DISCONTINUED | OUTPATIENT
Start: 2020-07-25 | End: 2020-07-27 | Stop reason: HOSPADM

## 2020-07-25 RX ORDER — LORAZEPAM 0.5 MG/1
0.5 TABLET ORAL EVERY 8 HOURS PRN
Status: DISCONTINUED | OUTPATIENT
Start: 2020-07-25 | End: 2020-07-26

## 2020-07-25 RX ORDER — POTASSIUM CHLORIDE 20 MEQ/1
20 TABLET, EXTENDED RELEASE ORAL ONCE
Status: COMPLETED | OUTPATIENT
Start: 2020-07-25 | End: 2020-07-25

## 2020-07-25 RX ORDER — ONDANSETRON 2 MG/ML
4 INJECTION INTRAMUSCULAR; INTRAVENOUS EVERY 6 HOURS PRN
Status: DISCONTINUED | OUTPATIENT
Start: 2020-07-25 | End: 2020-07-27 | Stop reason: HOSPADM

## 2020-07-25 RX ORDER — HYDRALAZINE HYDROCHLORIDE 10 MG/1
10 TABLET, FILM COATED ORAL EVERY 6 HOURS PRN
Status: DISCONTINUED | OUTPATIENT
Start: 2020-07-25 | End: 2020-07-26

## 2020-07-25 RX ORDER — ACETAMINOPHEN 325 MG/1
650 TABLET ORAL EVERY 6 HOURS PRN
Status: DISCONTINUED | OUTPATIENT
Start: 2020-07-25 | End: 2020-07-27 | Stop reason: HOSPADM

## 2020-07-25 RX ADMIN — LAMOTRIGINE 150 MG: 100 TABLET ORAL at 08:44

## 2020-07-25 RX ADMIN — POTASSIUM CHLORIDE 20 MEQ: 1500 TABLET, EXTENDED RELEASE ORAL at 15:05

## 2020-07-25 RX ADMIN — Medication 100 MG: at 08:44

## 2020-07-25 RX ADMIN — FOLIC ACID 1 MG: 1 TABLET ORAL at 08:44

## 2020-07-25 RX ADMIN — Medication 1 TABLET: at 08:44

## 2020-07-25 RX ADMIN — HYDRALAZINE HYDROCHLORIDE 10 MG: 10 TABLET, FILM COATED ORAL at 20:35

## 2020-07-25 RX ADMIN — ENOXAPARIN SODIUM 40 MG: 40 INJECTION SUBCUTANEOUS at 08:44

## 2020-07-25 RX ADMIN — LORAZEPAM 0.5 MG: 0.5 TABLET ORAL at 22:15

## 2020-07-25 RX ADMIN — TAMSULOSIN HYDROCHLORIDE 0.4 MG: 0.4 CAPSULE ORAL at 20:35

## 2020-07-25 RX ADMIN — NICOTINE 7 MG: 7 PATCH TRANSDERMAL at 08:44

## 2020-07-25 NOTE — PROGRESS NOTES
Patient examined spoke with the  at bedside discussed with the nurse  She reports that patient still have urinary hesitancy and she is not medically clear  Patient and  are able to communicate their feelings well and they are concerned about their son who is coming back from [facility]  Patient also reports that her father is aware that she is in the hospital and he is concerned about it  Patient is able to communicate her feelings well and she had hard time to deal with all this stress what she went through and she ended up taking an overdose of the pills  Patient wants to get better and take care of the family after the discussion we spoke about the voluntary inpatient psych admission versus involuntary  Once patient is medically stable she will be transferred to inpatient psychiatric care  Patient has been on one-to-one observation for suicidal precaution  Patient is not agitated  Therapy done with good response  Continue one-to-one suicidal precaution  Therapy done with good response  Nurse reported no behavior problem  I will follow up

## 2020-07-25 NOTE — PROGRESS NOTES
Progress Note - Giuliana Houston 1965, 49 y o  female MRN: [id number]    Unit/Bed#: [address] [place] [address] Encounter: [id number]    Primary Care Provider: No primary care provider on file  Date and time admitted to hospital: 7/22/2020  9:50 PM        * Anticholinergic drug overdose  Assessment & Plan  Attempted suicide by drinking a bottle of wine and ingesting at least 15 tabs of Benadryl 25 mg   Brought in by   Agitated/confused on arrival  UDS negative  Tylenol level negative  Given 2 mg of Ativan on arrival and intubated for airway protection  Extubated on 7/23 and satting well air currently  Seen by psych, appreciate input  Recommend inpatient psych once medically cleared, continue one-to-one  Will consult crisis once patient is medically cleared      Acute respiratory failure with hypoxia ([address] )  Assessment & Plan  Extubated without incident  Satting well on room air  Encourage IS    Alcoholic intoxication with complication ([facility] )  Assessment & Plan  Alcohol level 218 on admission  Continue CIWA protocol  Continue folic acid/MVI/thiamine  Alcohol cessation      Elevated blood pressure reading  Assessment & Plan  Secondary to anticholinergic overdose  Received nicardipine drip in ICU  BP improving  Monitor    Acute urinary retention  Assessment & Plan  Patient had Huffman in ICU  Removed yesterday  Straight cath this morning yielded 1200ml,last night 1000ml  Likely secondary to anticholinergic effect  Continue retention protocol  Creatinine normal     Bipolar depression (HCC)  Assessment & Plan  Continue Lamictal   Hold Cymbalta  Psych following appreciate input  Obese  Assessment & Plan  Body mass index is 34 09 kg/m²  Diet and lifestyle modification        VTE Pharmacologic Prophylaxis:   Pharmacologic: Enoxaparin (Lovenox)  Mechanical VTE Prophylaxis in Place: Yes    Patient Centered Rounds: I have performed bedside rounds with nursing staff today      Discussions with Specialists or Other Care Team Provider: yes    Education and Discussions with Family / Patient: yes    Time Spent for Care: 20 minutes  More than 50% of total time spent on counseling and coordination of care as described above  Current Length of Stay: 3 day(s)    Current Patient Status: Inpatient   Certification Statement: The patient will continue to require additional inpatient hospital stay due to Drug overdose, suicide attempt    Discharge Plan:  Inpatient psych once medically cleared    Code Status: Level 1 - Full Code      Subjective:   Patient reports thirsty, unable to void since catheter removed  Denies SOB, chest pain, headache, dizziness, nausea, vomiting, diarrhea, constipation, fever or chills  Reports throat discomfort from intubation and dry cough  Objective:     Vitals:   Temp (24hrs), Av 3 °F (36 3 °C), Min:96 6 °F (35 9 °C), Max:98 1 °F (36 7 °C)    Temp:  [96 6 °F (35 9 °C)-98 1 °F (36 7 °C)] 98 1 °F (36 7 °C)  HR:  [68-83] 72  Resp:  [16-18] 16  BP: (136-152)/() 137/86  SpO2:  [96 %-99 %] 96 %  Body mass index is 34 09 kg/m²  Input and Output Summary (last 24 hours): Intake/Output Summary (Last 24 hours) at 2020 1454  Last data filed at 2020 1019  Gross per 24 hour   Intake 300 ml   Output 2200 ml   Net -1900 ml       Physical Exam:     Physical Exam   Constitutional: She is oriented to person, place, and time  She appears well-developed and well-nourished  HENT:   Head: Normocephalic and atraumatic  Neck: Normal range of motion  Neck supple  No JVD present  No tracheal deviation present  No thyromegaly present  Cardiovascular: Normal rate and regular rhythm  No murmur heard  Pulmonary/Chest: Effort normal and breath sounds normal  No respiratory distress  She has no wheezes  She has no rales  Abdominal: Soft  Bowel sounds are normal  She exhibits no distension  There is no tenderness  There is no guarding     Musculoskeletal: She exhibits no edema, tenderness or deformity  Neurological: She is alert and oriented to person, place, and time  Skin: Skin is warm and dry  Psychiatric: She has a normal mood and affect  Judgment normal    Nursing note and vitals reviewed  Additional Data:     Labs:    Results from last 7 days   Lab Units 07/25/20  0513   WBC Thousand/uL 9 18   HEMOGLOBIN g/dL 14 4   HEMATOCRIT % 43 4   PLATELETS Thousands/uL 213   NEUTROS PCT % 61   LYMPHS PCT % 25   MONOS PCT % 7   EOS PCT % 7*     Results from last 7 days   Lab Units 07/25/20  0513   POTASSIUM mmol/L 3 9   CHLORIDE mmol/L 103   CO2 mmol/L 24   BUN mg/dL 11   CREATININE mg/dL 0 73   CALCIUM mg/dL 8 3   ALK PHOS U/L 116   ALT U/L 24   AST U/L 25           * I Have Reviewed All Lab Data Listed Above  * Additional Pertinent Lab Tests Reviewed: Dave [address] Admission Reviewed    Imaging:    Imaging Reports Reviewed Today Include:  None  Imaging Personally Reviewed by Myself Includes:  None    Recent Cultures (last 7 days):           Last 24 Hours Medication List:     Current Facility-Administered Medications:  enoxaparin 40 mg Subcutaneous Daily Jovani Marie, DO   folic acid 1 mg Oral Daily Jovani Marie, DO   lamoTRIgine 150 mg Oral Daily Jovani Marie, DO   multivitamin-minerals 1 tablet Oral Daily Jovani Marie, DO   nicotine 7 mg Transdermal Daily Jovani Marie, DO   thiamine 100 mg Oral Daily Jovani Adler DO        Today, Patient Was Seen By: KYLE Morris    ** Please Note: Dragon 360 Dictation voice to text software may have been used in the creation of this document   **

## 2020-07-26 LAB
ANION GAP SERPL CALCULATED.3IONS-SCNC: 10 MMOL/L (ref 4–13)
ATRIAL RATE: 79 BPM
BUN SERPL-MCNC: 10 MG/DL (ref 5–25)
CALCIUM SERPL-MCNC: 8.4 MG/DL (ref 8.3–10.1)
CHLORIDE SERPL-SCNC: 103 MMOL/L (ref 100–108)
CO2 SERPL-SCNC: 24 MMOL/L (ref 21–32)
CREAT SERPL-MCNC: 0.68 MG/DL (ref 0.6–1.3)
GFR SERPL CREATININE-BSD FRML MDRD: 100 ML/MIN/1.73SQ M
GLUCOSE SERPL-MCNC: 89 MG/DL (ref 65–140)
MAGNESIUM SERPL-MCNC: 1.9 MG/DL (ref 1.6–2.6)
P AXIS: 36 DEGREES
POTASSIUM SERPL-SCNC: 3.6 MMOL/L (ref 3.5–5.3)
PR INTERVAL: 158 MS
QRS AXIS: 50 DEGREES
QRSD INTERVAL: 74 MS
QT INTERVAL: 412 MS
QTC INTERVAL: 472 MS
SODIUM SERPL-SCNC: 137 MMOL/L (ref 136–145)
T WAVE AXIS: 46 DEGREES
VENTRICULAR RATE: 79 BPM

## 2020-07-26 PROCEDURE — 83735 ASSAY OF MAGNESIUM: CPT | Performed by: NURSE PRACTITIONER

## 2020-07-26 PROCEDURE — 80048 BASIC METABOLIC PNL TOTAL CA: CPT | Performed by: NURSE PRACTITIONER

## 2020-07-26 PROCEDURE — 99232 SBSQ HOSP IP/OBS MODERATE 35: CPT | Performed by: NURSE PRACTITIONER

## 2020-07-26 PROCEDURE — 94760 N-INVAS EAR/PLS OXIMETRY 1: CPT

## 2020-07-26 PROCEDURE — 93010 ELECTROCARDIOGRAM REPORT: CPT | Performed by: INTERNAL MEDICINE

## 2020-07-26 RX ORDER — AMLODIPINE BESYLATE 5 MG/1
5 TABLET ORAL DAILY
Status: DISCONTINUED | OUTPATIENT
Start: 2020-07-26 | End: 2020-07-27

## 2020-07-26 RX ORDER — LORAZEPAM 2 MG/ML
2 INJECTION INTRAMUSCULAR ONCE
Status: COMPLETED | OUTPATIENT
Start: 2020-07-26 | End: 2020-07-26

## 2020-07-26 RX ORDER — HYDRALAZINE HYDROCHLORIDE 25 MG/1
25 TABLET, FILM COATED ORAL EVERY 6 HOURS
Status: DISCONTINUED | OUTPATIENT
Start: 2020-07-27 | End: 2020-07-26 | Stop reason: SDUPTHER

## 2020-07-26 RX ORDER — POTASSIUM CHLORIDE 20 MEQ/1
40 TABLET, EXTENDED RELEASE ORAL ONCE
Status: COMPLETED | OUTPATIENT
Start: 2020-07-26 | End: 2020-07-26

## 2020-07-26 RX ORDER — LABETALOL 20 MG/4 ML (5 MG/ML) INTRAVENOUS SYRINGE
10 ONCE
Status: COMPLETED | OUTPATIENT
Start: 2020-07-26 | End: 2020-07-26

## 2020-07-26 RX ORDER — LABETALOL 20 MG/4 ML (5 MG/ML) INTRAVENOUS SYRINGE
20 ONCE
Status: COMPLETED | OUTPATIENT
Start: 2020-07-26 | End: 2020-07-26

## 2020-07-26 RX ORDER — SODIUM CHLORIDE, SODIUM LACTATE, POTASSIUM CHLORIDE, CALCIUM CHLORIDE 600; 310; 30; 20 MG/100ML; MG/100ML; MG/100ML; MG/100ML
50 INJECTION, SOLUTION INTRAVENOUS CONTINUOUS
Status: DISCONTINUED | OUTPATIENT
Start: 2020-07-26 | End: 2020-07-27

## 2020-07-26 RX ORDER — HYDRALAZINE HYDROCHLORIDE 20 MG/ML
10 INJECTION INTRAMUSCULAR; INTRAVENOUS EVERY 6 HOURS PRN
Status: DISCONTINUED | OUTPATIENT
Start: 2020-07-26 | End: 2020-07-27

## 2020-07-26 RX ORDER — LABETALOL 20 MG/4 ML (5 MG/ML) INTRAVENOUS SYRINGE
20 EVERY 4 HOURS PRN
Status: DISCONTINUED | OUTPATIENT
Start: 2020-07-26 | End: 2020-07-26

## 2020-07-26 RX ORDER — LABETALOL 100 MG/1
200 TABLET, FILM COATED ORAL EVERY 12 HOURS SCHEDULED
Status: DISCONTINUED | OUTPATIENT
Start: 2020-07-26 | End: 2020-07-27

## 2020-07-26 RX ORDER — LORAZEPAM 1 MG/1
1 TABLET ORAL EVERY 8 HOURS PRN
Status: DISCONTINUED | OUTPATIENT
Start: 2020-07-26 | End: 2020-07-27 | Stop reason: HOSPADM

## 2020-07-26 RX ORDER — LABETALOL 20 MG/4 ML (5 MG/ML) INTRAVENOUS SYRINGE
10 ONCE
Status: DISCONTINUED | OUTPATIENT
Start: 2020-07-26 | End: 2020-07-26

## 2020-07-26 RX ORDER — MAGNESIUM HYDROXIDE/ALUMINUM HYDROXICE/SIMETHICONE 120; 1200; 1200 MG/30ML; MG/30ML; MG/30ML
30 SUSPENSION ORAL EVERY 4 HOURS PRN
Status: DISCONTINUED | OUTPATIENT
Start: 2020-07-26 | End: 2020-07-27 | Stop reason: HOSPADM

## 2020-07-26 RX ORDER — DULOXETIN HYDROCHLORIDE 60 MG/1
60 CAPSULE, DELAYED RELEASE ORAL DAILY
Status: DISCONTINUED | OUTPATIENT
Start: 2020-07-26 | End: 2020-07-27 | Stop reason: HOSPADM

## 2020-07-26 RX ORDER — POTASSIUM CHLORIDE 20 MEQ/1
40 TABLET, EXTENDED RELEASE ORAL ONCE
Status: DISCONTINUED | OUTPATIENT
Start: 2020-07-26 | End: 2020-07-26

## 2020-07-26 RX ORDER — HYDRALAZINE HYDROCHLORIDE 25 MG/1
25 TABLET, FILM COATED ORAL EVERY 6 HOURS PRN
Status: DISCONTINUED | OUTPATIENT
Start: 2020-07-26 | End: 2020-07-26

## 2020-07-26 RX ADMIN — ACETAMINOPHEN 650 MG: 325 TABLET, FILM COATED ORAL at 05:58

## 2020-07-26 RX ADMIN — HYDRALAZINE HYDROCHLORIDE 25 MG: 25 TABLET, FILM COATED ORAL at 16:52

## 2020-07-26 RX ADMIN — LABETALOL 20 MG/4 ML (5 MG/ML) INTRAVENOUS SYRINGE 10 MG: at 00:13

## 2020-07-26 RX ADMIN — Medication 1 TABLET: at 08:35

## 2020-07-26 RX ADMIN — HYDRALAZINE HYDROCHLORIDE 10 MG: 10 TABLET, FILM COATED ORAL at 10:51

## 2020-07-26 RX ADMIN — ACETAMINOPHEN 650 MG: 325 TABLET, FILM COATED ORAL at 19:37

## 2020-07-26 RX ADMIN — ENOXAPARIN SODIUM 40 MG: 40 INJECTION SUBCUTANEOUS at 08:35

## 2020-07-26 RX ADMIN — LABETALOL HYDROCHLORIDE 200 MG: 100 TABLET, FILM COATED ORAL at 21:00

## 2020-07-26 RX ADMIN — TAMSULOSIN HYDROCHLORIDE 0.4 MG: 0.4 CAPSULE ORAL at 16:53

## 2020-07-26 RX ADMIN — POTASSIUM CHLORIDE 40 MEQ: 1500 TABLET, EXTENDED RELEASE ORAL at 10:39

## 2020-07-26 RX ADMIN — NICOTINE 7 MG: 7 PATCH TRANSDERMAL at 08:36

## 2020-07-26 RX ADMIN — LAMOTRIGINE 150 MG: 100 TABLET ORAL at 08:35

## 2020-07-26 RX ADMIN — ACETAMINOPHEN 650 MG: 325 TABLET, FILM COATED ORAL at 12:57

## 2020-07-26 RX ADMIN — DULOXETINE HYDROCHLORIDE 60 MG: 60 CAPSULE, DELAYED RELEASE ORAL at 12:57

## 2020-07-26 RX ADMIN — Medication 100 MG: at 08:35

## 2020-07-26 RX ADMIN — AMLODIPINE BESYLATE 5 MG: 5 TABLET ORAL at 10:39

## 2020-07-26 RX ADMIN — LORAZEPAM 2 MG: 2 INJECTION INTRAMUSCULAR; INTRAVENOUS at 20:58

## 2020-07-26 RX ADMIN — LABETALOL 20 MG/4 ML (5 MG/ML) INTRAVENOUS SYRINGE 20 MG: at 16:52

## 2020-07-26 RX ADMIN — SODIUM CHLORIDE, SODIUM LACTATE, POTASSIUM CHLORIDE, AND CALCIUM CHLORIDE 50 ML/HR: .6; .31; .03; .02 INJECTION, SOLUTION INTRAVENOUS at 19:38

## 2020-07-26 RX ADMIN — ONDANSETRON 4 MG: 2 INJECTION INTRAMUSCULAR; INTRAVENOUS at 10:43

## 2020-07-26 RX ADMIN — FOLIC ACID 1 MG: 1 TABLET ORAL at 08:35

## 2020-07-26 RX ADMIN — LABETALOL 20 MG/4 ML (5 MG/ML) INTRAVENOUS SYRINGE 20 MG: at 13:46

## 2020-07-26 RX ADMIN — ONDANSETRON 4 MG: 2 INJECTION INTRAMUSCULAR; INTRAVENOUS at 20:08

## 2020-07-26 RX ADMIN — LORAZEPAM 0.5 MG: 0.5 TABLET ORAL at 10:51

## 2020-07-26 NOTE — CONSULTS
H&P Exam - Urology       Patient: López Zhao   : 1965 Sex: female   MRN: [id number]     CSN: [id number]      History of Present Illness   HPI:  López Zhoa is a 49 y o  female who presents with benadryl /alcohol overdose/sucide now in retention out of icu strait cath        Review of Systems:   Constitutional:  Negative for activity change, fever, chills and diaphoresis  HENT: Negative for hearing loss and trouble swallowing  Eyes: Negative for itching and visual disturbance  Respiratory: Negative for chest tightness and shortness of breath  Cardiovascular: Negative for chest pain, edema  Gastrointestinal: Negative for abdominal distention, na abdominal pain, constipation, diarrhea, Nausea and vomiting  Genitourinary: Negative for decreased urine volume, difficulty urinating, dysuria, enuresis, frequency, hematuria and urgency  Musculoskeletal: Negative for gait problem and myalgias  Neurological: Negative for dizziness and headaches  Hematological: Does not bruise/bleed easily  Historical Information   Past Medical History:   Diagnosis Date    Depression     Psychiatric disorder      History reviewed  No pertinent surgical history  Social History   Social History     Substance and Sexual Activity   Alcohol Use Yes     Social History     Substance and Sexual Activity   Drug Use Not Currently     Social History     Tobacco Use   Smoking Status Current Every Day Smoker    Packs/day: 1 00   Smokeless Tobacco Never Used     Family History: History reviewed  No pertinent family history      Meds/Allergies   Medications Prior to Admission   Medication    cetirizine (ZyrTEC) 10 mg tablet    Doxylamine Succinate, Sleep, (UNISOM PO)    DULoxetine (CYMBALTA) 60 mg delayed release capsule    ferrous sulfate 325 (65 Fe) mg tablet    lamoTRIgine (LaMICtal) 150 MG tablet    LORazepam (ATIVAN) 1 mg tablet    senna (SENOKOT) 8 6 MG tablet    SUMAtriptan (IMITREX) 100 mg tablet Allergies   Allergen Reactions    Penicillins        Objective   Vitals: /85   Pulse 70   Temp 98 2 °F (36 8 °C)   Resp 18   Ht 5' 6" (1 676 m)   Wt 94 3 kg (207 lb 14 3 oz)   LMP 07/22/2020   SpO2 100%   BMI 33 55 kg/m²     Physical Exam:  General Alert awake   Normocephalic atraumatic PERRLA  Lungs clear bilaterally  Cardiac normal S1 normal S2  Abdomen soft, flank pain  Extremities no edema    I/O last 24 hours: In: -   Out: 3005 [Urine:4475]    Invasive Devices     Peripheral Intravenous Line            Peripheral IV 07/23/20 Dorsal (posterior); Left Hand 2 days          Drain            Urethral Catheter Latex 16 Fr  less than 1 day                    Lab Results: CBC:   Lab Results   Component Value Date    WBC 9 18 07/25/2020    HGB 14 4 07/25/2020    HCT 43 4 07/25/2020    MCV 93 07/25/2020     07/25/2020    MCH 30 9 07/25/2020    MCHC 33 2 07/25/2020    RDW 14 2 07/25/2020    MPV 10 4 07/25/2020    NRBC 0 07/25/2020     CMP:   Lab Results   Component Value Date     07/26/2020    CO2 24 07/26/2020    BUN 10 07/26/2020    CREATININE 0 68 07/26/2020    CALCIUM 8 4 07/26/2020    AST 25 07/25/2020    ALT 24 07/25/2020    ALKPHOS 116 07/25/2020    EGFR 100 07/26/2020     Urinalysis: No results found for: Irean Heck, SPECGRAV, PHUR, LEUKOCYTESUR, NITRITE, PROTEINUA, GLUCOSEU, KETONESU, BILIRUBINUR, BLOODU  Urine Culture: No results found for: URINECX  PSA: No results found for: PSA        Assessment/ Plan:  Urinary retention  Due to benadryl overdose  Mental status changes  Continue post x 3 days  flomax   4mg  D/c post alert/awake  7/28      Tamela Yañez MD

## 2020-07-26 NOTE — PROGRESS NOTES
Patient examined spoke with the nurse she reports that patient blood pressure is running high and also urology consultation is asked for urinary problem  Patient examined spoke with the  at bedside  After the discussion she wants to start her medication Cymbalta Lamictal and Ativan p r n     I reviewed her medications  Patient is on Lamictal and Ativan p r n  At this time she was taking Cymbalta 120 mg daily but I will start her on 60 mg daily to start with at this time  Patient is still medically not clear  Patient remain emotional labile depressed and talks about the stressors family issues  Patient and  both are accepting the need for inpatient psychiatric care and they reported to me that she prefers to go to [facility]   checked with the hospital and they he will have 5 discharges tomorrow and they prefer to go there for inpatient psychiatric care after medical stabilization  Patient is cooperative and she is able to communicate her feelings well  I also advised the  to talk with the  tomorrow and relay their preference for a transfer to inpatient psychiatric care at the [facility] he will follow up on that with the  tomorrow  Patient is cooperative she regrets her attempt of suicide she wants to get better and move on with the life  No psychosis no hallucinations  Patient is depressed emotional and labile impulsive and she has a hard time to deal with the stressors  Therapy done with good response  Continue Lamictal and Ativan p r n  Order Cymbalta 60 mg daily for now and continue one-to-one suicidal precaution  I will follow up

## 2020-07-26 NOTE — PROGRESS NOTES
Progress Note - Tiara Love 1965, 49 y o  female MRN: [id number]    Unit/Bed#: [address] [place] [address] Encounter: [id number]    Primary Care Provider: No primary care provider on file  Date and time admitted to hospital: 7/22/2020  9:50 PM        * Anticholinergic drug overdose  Assessment & Plan  Attempted suicide by drinking a bottle of wine and ingesting at least 15 tabs of Benadryl 25 mg   Brought in by   Agitated/confused on arrival  UDS negative  Tylenol level negative  Given 2 mg of Ativan on arrival and intubated for airway protection  Extubated on 7/23 and satting well air currently  Seen by psych, appreciate input  Recommend inpatient psych once medically cleared, continue one-to-one  Will consult crisis once patient is medically cleared      Acute respiratory failure with hypoxia ([facility] [address] )  Assessment & Plan  Extubated without incident  Satting well on room air  Encourage IS    Alcoholic intoxication with complication ([facility] [address] )  Assessment & Plan  Alcohol level 218 on admission  Continue CIWA protocol  Continue folic acid/MVI/thiamine  Alcohol cessation      Elevated blood pressure reading  Assessment & Plan  Secondary to anticholinergic overdose  Received nicardipine drip in ICU  BP elevated overnight and this morning  Start Norvasc  Continue hydralazine p o  And Ativan p r n  Acute urinary retention  Assessment & Plan  Patient had Huffman in ICU  Failed voiding trial  Required straight cath x2 yielded 1200ml,1000ml each time  Patient reports unable to void  Likely secondary to anticholinergic effect  Creatinine normal   Huffman inserted yesterday  Consulted urology, appreciate input  Start Flomax  Keep Huffman for 3 days  DC Huffman on 7/28    Bipolar depression ([facility] [address] )  Assessment & Plan  Continue Lamictal   Hold Cymbalta  Psych following appreciate input  Obese  Assessment & Plan  Body mass index is 33 55 kg/m²     Diet and lifestyle modification        VTE Pharmacologic Prophylaxis: Pharmacologic: Enoxaparin (Lovenox)  Mechanical VTE Prophylaxis in Place: Yes    Patient Centered Rounds: I have performed bedside rounds with nursing staff today  Discussions with Specialists or Other Care Team Provider:  Urology    Education and Discussions with Family / Patient:  Yes    Time Spent for Care: 20 minutes  More than 50% of total time spent on counseling and coordination of care as described above  Current Length of Stay: 4 day(s)    Current Patient Status: Inpatient   Certification Statement: The patient will continue to require additional inpatient hospital stay due to Anticholinergic overdose, acute urinary retention, elevated BP    Discharge Plan:  Inpatient psych once medical cleared    Code Status: Level 1 - Full Code      Subjective:   Patient reports feeling dizzy/lightheaded this morning may be due to high BP  Denies headache, SOB, nausea, vomiting  Reports watery diarrhea 20 times since yesterday evening  Reports pressure in abdomen  Denies diarrhea this morning after breakfast   Denies fever, chills  Objective:     Vitals:   Temp (24hrs), Av 1 °F (36 7 °C), Min:98 1 °F (36 7 °C), Max:98 2 °F (36 8 °C)    Temp:  [98 1 °F (36 7 °C)-98 2 °F (36 8 °C)] 98 2 °F (36 8 °C)  HR:  [70-79] 75  Resp:  [17-18] 18  BP: (165-208)/() 175/120  SpO2:  [97 %-100 %] 100 %  Body mass index is 33 55 kg/m²  Input and Output Summary (last 24 hours): Intake/Output Summary (Last 24 hours) at 2020 1138  Last data filed at 2020 0617  Gross per 24 hour   Intake    Output 3275 ml   Net -3275 ml       Physical Exam:     Physical Exam   Constitutional: She is oriented to person, place, and time  She appears well-developed  HENT:   Head: Normocephalic and atraumatic  Neck: Normal range of motion  Neck supple  No JVD present  No tracheal deviation present  No thyromegaly present  Cardiovascular: Normal rate and regular rhythm  No murmur heard    Pulmonary/Chest: Effort normal and breath sounds normal  No respiratory distress  She has no wheezes  She has no rales  Abdominal: Soft  Bowel sounds are normal  She exhibits no distension  There is no tenderness  There is no guarding  Genitourinary:   Genitourinary Comments: Huffman in situ,draing clear yellow urine  Musculoskeletal: Normal range of motion  She exhibits no edema, tenderness or deformity  Neurological: She is alert and oriented to person, place, and time  Skin: Skin is warm and dry  Psychiatric: She has a normal mood and affect  Judgment normal    Nursing note and vitals reviewed  Additional Data:     Labs:    Results from last 7 days   Lab Units 07/25/20  0513   WBC Thousand/uL 9 18   HEMOGLOBIN g/dL 14 4   HEMATOCRIT % 43 4   PLATELETS Thousands/uL 213   NEUTROS PCT % 61   LYMPHS PCT % 25   MONOS PCT % 7   EOS PCT % 7*     Results from last 7 days   Lab Units 07/26/20  0611 07/25/20  0513   POTASSIUM mmol/L 3 6 3 9   CHLORIDE mmol/L 103 103   CO2 mmol/L 24 24   BUN mg/dL 10 11   CREATININE mg/dL 0 68 0 73   CALCIUM mg/dL 8 4 8 3   ALK PHOS U/L  --  116   ALT U/L  --  24   AST U/L  --  25           * I Have Reviewed All Lab Data Listed Above  * Additional Pertinent Lab Tests Reviewed:  Dave [address] Admission Reviewed    Imaging:    Imaging Reports Reviewed Today Include:  None  Imaging Personally Reviewed by Myself Includes:  None    Recent Cultures (last 7 days):           Last 24 Hours Medication List:     Current Facility-Administered Medications:  acetaminophen 650 mg Oral Q6H PRN KYLE Owens   amLODIPine 5 mg Oral Daily KYLE Owens   enoxaparin 40 mg Subcutaneous Daily Jovani Marie, DO   folic acid 1 mg Oral Daily Jovani Marie, DO   hydrALAZINE 10 mg Oral Q6H PRN KYLE Owens   lamoTRIgine 150 mg Oral Daily Jovani Marie, DO   LORazepam 0 5 mg Oral Q8H PRN KYLE Owens   multivitamin-minerals 1 tablet Oral Daily Jovani Marie, DO   nicotine 7 mg Transdermal Daily Jovani Marie DO   ondansetron 4 mg Intravenous Q6H PRN KYLE Owens   potassium chloride 40 mEq Oral Once KYLE Owens   tamsulosin 0 4 mg Oral Daily With KYLE Stewart   thiamine 100 mg Oral Daily Jovani Leary DO        Today, Patient Was Seen By: KYLE Alvares    ** Please Note: Dragon 360 Dictation voice to text software may have been used in the creation of this document   **

## 2020-07-27 ENCOUNTER — HOSPITAL ENCOUNTER (INPATIENT)
Facility: HOSPITAL | Age: 55
LOS: 4 days | Discharge: HOME | DRG: 885 | End: 2020-07-31
Attending: PSYCHIATRY & NEUROLOGY | Admitting: PSYCHIATRY & NEUROLOGY
Payer: COMMERCIAL

## 2020-07-27 VITALS
HEART RATE: 74 BPM | SYSTOLIC BLOOD PRESSURE: 179 MMHG | WEIGHT: 207.89 LBS | HEIGHT: 66 IN | RESPIRATION RATE: 18 BRPM | OXYGEN SATURATION: 94 % | DIASTOLIC BLOOD PRESSURE: 101 MMHG | BODY MASS INDEX: 33.41 KG/M2 | TEMPERATURE: 97.6 F

## 2020-07-27 DIAGNOSIS — I10 ESSENTIAL HYPERTENSION: ICD-10-CM

## 2020-07-27 DIAGNOSIS — T14.91XA SUICIDE ATTEMPT (HCC): ICD-10-CM

## 2020-07-27 DIAGNOSIS — Z00.8 MEDICAL CLEARANCE FOR PSYCHIATRIC ADMISSION: ICD-10-CM

## 2020-07-27 DIAGNOSIS — T44.3X2A ANTICHOLINERGIC DRUG OVERDOSE, INTENTIONAL SELF-HARM, INITIAL ENCOUNTER (HCC): ICD-10-CM

## 2020-07-27 DIAGNOSIS — F43.10 PTSD (POST-TRAUMATIC STRESS DISORDER): ICD-10-CM

## 2020-07-27 DIAGNOSIS — F10.929 ALCOHOLIC INTOXICATION WITH COMPLICATION (HCC): ICD-10-CM

## 2020-07-27 DIAGNOSIS — R03.0 ELEVATED BLOOD PRESSURE READING: ICD-10-CM

## 2020-07-27 DIAGNOSIS — F10.929 ALCOHOL INTOXICATION (HCC): ICD-10-CM

## 2020-07-27 DIAGNOSIS — R94.31 QT PROLONGATION: ICD-10-CM

## 2020-07-27 DIAGNOSIS — Z72.89 ALCOHOL USE: ICD-10-CM

## 2020-07-27 DIAGNOSIS — E56.9 VITAMIN DEFICIENCY: ICD-10-CM

## 2020-07-27 DIAGNOSIS — J96.91 RESPIRATORY FAILURE WITH HYPOXIA (HCC): ICD-10-CM

## 2020-07-27 DIAGNOSIS — J96.01 ACUTE RESPIRATORY FAILURE WITH HYPOXIA (HCC): ICD-10-CM

## 2020-07-27 DIAGNOSIS — R33.8 ACUTE URINARY RETENTION: ICD-10-CM

## 2020-07-27 DIAGNOSIS — F31.9 BIPOLAR DEPRESSION (HCC): Primary | ICD-10-CM

## 2020-07-27 DIAGNOSIS — K21.9 GERD (GASTROESOPHAGEAL REFLUX DISEASE): ICD-10-CM

## 2020-07-27 DIAGNOSIS — T50.902A OVERDOSE, INTENTIONAL SELF-HARM, INITIAL ENCOUNTER (HCC): ICD-10-CM

## 2020-07-27 DIAGNOSIS — F10.10 ETOH ABUSE: ICD-10-CM

## 2020-07-27 DIAGNOSIS — R52 PAIN: ICD-10-CM

## 2020-07-27 LAB
ANION GAP SERPL CALCULATED.3IONS-SCNC: 8 MMOL/L (ref 4–13)
BUN SERPL-MCNC: 8 MG/DL (ref 5–25)
CALCIUM SERPL-MCNC: 8.8 MG/DL (ref 8.3–10.1)
CHLORIDE SERPL-SCNC: 101 MMOL/L (ref 100–108)
CO2 SERPL-SCNC: 27 MMOL/L (ref 21–32)
CREAT SERPL-MCNC: 0.82 MG/DL (ref 0.6–1.3)
ERYTHROCYTE [DISTWIDTH] IN BLOOD BY AUTOMATED COUNT: 14.2 % (ref 11.6–15.1)
GFR SERPL CREATININE-BSD FRML MDRD: 82 ML/MIN/1.73SQ M
GLUCOSE SERPL-MCNC: 91 MG/DL (ref 65–140)
HCT VFR BLD AUTO: 43.1 % (ref 34.8–46.1)
HGB BLD-MCNC: 14.2 G/DL (ref 11.5–15.4)
LAMOTRIGINE SERPL-MCNC: 4.1 UG/ML (ref 2–20)
MAGNESIUM SERPL-MCNC: 1.9 MG/DL (ref 1.6–2.6)
MCH RBC QN AUTO: 31.1 PG (ref 26.8–34.3)
MCHC RBC AUTO-ENTMCNC: 32.9 G/DL (ref 31.4–37.4)
MCV RBC AUTO: 95 FL (ref 82–98)
PLATELET # BLD AUTO: 232 THOUSANDS/UL (ref 149–390)
PMV BLD AUTO: 10 FL (ref 8.9–12.7)
POTASSIUM SERPL-SCNC: 3.8 MMOL/L (ref 3.5–5.3)
RBC # BLD AUTO: 4.56 MILLION/UL (ref 3.81–5.12)
SODIUM SERPL-SCNC: 136 MMOL/L (ref 136–145)
WBC # BLD AUTO: 8.93 THOUSAND/UL (ref 4.31–10.16)

## 2020-07-27 PROCEDURE — 99239 HOSP IP/OBS DSCHRG MGMT >30: CPT | Performed by: INTERNAL MEDICINE

## 2020-07-27 PROCEDURE — 85027 COMPLETE CBC AUTOMATED: CPT | Performed by: NURSE PRACTITIONER

## 2020-07-27 PROCEDURE — 83735 ASSAY OF MAGNESIUM: CPT | Performed by: NURSE PRACTITIONER

## 2020-07-27 PROCEDURE — 80048 BASIC METABOLIC PNL TOTAL CA: CPT | Performed by: NURSE PRACTITIONER

## 2020-07-27 RX ORDER — LABETALOL 100 MG/1
200 TABLET, FILM COATED ORAL EVERY 12 HOURS SCHEDULED
Status: DISCONTINUED | OUTPATIENT
Start: 2020-07-27 | End: 2020-07-27

## 2020-07-27 RX ORDER — LABETALOL 100 MG/1
300 TABLET, FILM COATED ORAL EVERY 8 HOURS SCHEDULED
Status: CANCELLED | OUTPATIENT
Start: 2020-07-27

## 2020-07-27 RX ORDER — MULTIVITAMIN/IRON/FOLIC ACID 18MG-0.4MG
1 TABLET ORAL DAILY
Status: DISCONTINUED | OUTPATIENT
Start: 2020-07-28 | End: 2020-07-31 | Stop reason: HOSPADM

## 2020-07-27 RX ORDER — ACETAMINOPHEN 325 MG/1
650 TABLET ORAL EVERY 6 HOURS PRN
Status: DISCONTINUED | OUTPATIENT
Start: 2020-07-27 | End: 2020-07-31 | Stop reason: HOSPADM

## 2020-07-27 RX ORDER — OLANZAPINE 2.5 MG/1
2.5 TABLET ORAL
Status: CANCELLED | OUTPATIENT
Start: 2020-07-27

## 2020-07-27 RX ORDER — HYDROXYZINE HYDROCHLORIDE 25 MG/1
50 TABLET, FILM COATED ORAL EVERY 4 HOURS PRN
Status: CANCELLED | OUTPATIENT
Start: 2020-07-27

## 2020-07-27 RX ORDER — AMLODIPINE BESYLATE 5 MG/1
5 TABLET ORAL 2 TIMES DAILY
Status: CANCELLED | OUTPATIENT
Start: 2020-07-28

## 2020-07-27 RX ORDER — TRAZODONE HYDROCHLORIDE 50 MG/1
50 TABLET ORAL
Status: CANCELLED | OUTPATIENT
Start: 2020-07-27

## 2020-07-27 RX ORDER — AMLODIPINE BESYLATE 5 MG/1
5 TABLET ORAL DAILY
Status: DISCONTINUED | OUTPATIENT
Start: 2020-07-28 | End: 2020-07-27

## 2020-07-27 RX ORDER — RISPERIDONE 1 MG/1
1 TABLET, ORALLY DISINTEGRATING ORAL EVERY 6 HOURS PRN
Status: CANCELLED | OUTPATIENT
Start: 2020-07-27

## 2020-07-27 RX ORDER — AMLODIPINE BESYLATE 5 MG/1
5 TABLET ORAL 2 TIMES DAILY
Status: DISCONTINUED | OUTPATIENT
Start: 2020-07-27 | End: 2020-07-27 | Stop reason: HOSPADM

## 2020-07-27 RX ORDER — TRAZODONE HYDROCHLORIDE 50 MG/1
50 TABLET ORAL
Status: DISCONTINUED | OUTPATIENT
Start: 2020-07-27 | End: 2020-07-31 | Stop reason: HOSPADM

## 2020-07-27 RX ORDER — THIAMINE MONONITRATE (VIT B1) 100 MG
100 TABLET ORAL DAILY
Status: DISCONTINUED | OUTPATIENT
Start: 2020-07-28 | End: 2020-07-31 | Stop reason: HOSPADM

## 2020-07-27 RX ORDER — HALOPERIDOL 5 MG
5 TABLET ORAL EVERY 8 HOURS PRN
Status: DISCONTINUED | OUTPATIENT
Start: 2020-07-27 | End: 2020-07-31 | Stop reason: HOSPADM

## 2020-07-27 RX ORDER — FOLIC ACID 1 MG/1
1 TABLET ORAL DAILY
Status: CANCELLED | OUTPATIENT
Start: 2020-07-28

## 2020-07-27 RX ORDER — HYDROXYZINE HYDROCHLORIDE 25 MG/1
25 TABLET, FILM COATED ORAL EVERY 6 HOURS PRN
Status: CANCELLED | OUTPATIENT
Start: 2020-07-27

## 2020-07-27 RX ORDER — ACETAMINOPHEN 325 MG/1
975 TABLET ORAL EVERY 6 HOURS PRN
Status: DISCONTINUED | OUTPATIENT
Start: 2020-07-27 | End: 2020-07-31 | Stop reason: HOSPADM

## 2020-07-27 RX ORDER — LORAZEPAM 2 MG/ML
1 INJECTION INTRAMUSCULAR EVERY 6 HOURS PRN
Status: DISCONTINUED | OUTPATIENT
Start: 2020-07-27 | End: 2020-07-28

## 2020-07-27 RX ORDER — HALOPERIDOL 5 MG/ML
5 INJECTION INTRAMUSCULAR EVERY 8 HOURS PRN
Status: DISCONTINUED | OUTPATIENT
Start: 2020-07-27 | End: 2020-07-31 | Stop reason: HOSPADM

## 2020-07-27 RX ORDER — HYDROXYZINE 50 MG/1
50 TABLET, FILM COATED ORAL EVERY 4 HOURS PRN
Status: DISCONTINUED | OUTPATIENT
Start: 2020-07-27 | End: 2020-07-31 | Stop reason: HOSPADM

## 2020-07-27 RX ORDER — RISPERIDONE 1 MG/1
1 TABLET, ORALLY DISINTEGRATING ORAL EVERY 6 HOURS PRN
Status: DISCONTINUED | OUTPATIENT
Start: 2020-07-27 | End: 2020-07-31 | Stop reason: HOSPADM

## 2020-07-27 RX ORDER — BENZTROPINE MESYLATE 1 MG/ML
1 INJECTION INTRAMUSCULAR; INTRAVENOUS EVERY 6 HOURS PRN
Status: DISCONTINUED | OUTPATIENT
Start: 2020-07-27 | End: 2020-07-31 | Stop reason: HOSPADM

## 2020-07-27 RX ORDER — LABETALOL 100 MG/1
300 TABLET, FILM COATED ORAL EVERY 8 HOURS SCHEDULED
Status: DISCONTINUED | OUTPATIENT
Start: 2020-07-27 | End: 2020-07-27 | Stop reason: HOSPADM

## 2020-07-27 RX ORDER — HALOPERIDOL 5 MG/ML
5 INJECTION INTRAMUSCULAR EVERY 8 HOURS PRN
Status: CANCELLED | OUTPATIENT
Start: 2020-07-27

## 2020-07-27 RX ORDER — TAMSULOSIN HYDROCHLORIDE 0.4 MG/1
0.4 CAPSULE ORAL
Status: CANCELLED | OUTPATIENT
Start: 2020-07-28

## 2020-07-27 RX ORDER — FOLIC ACID 1 MG/1
1 TABLET ORAL DAILY
Status: DISCONTINUED | OUTPATIENT
Start: 2020-07-28 | End: 2020-07-31 | Stop reason: HOSPADM

## 2020-07-27 RX ORDER — DULOXETIN HYDROCHLORIDE 60 MG/1
60 CAPSULE, DELAYED RELEASE ORAL DAILY
Status: CANCELLED | OUTPATIENT
Start: 2020-07-28

## 2020-07-27 RX ORDER — BENZTROPINE MESYLATE 1 MG/1
1 TABLET ORAL EVERY 6 HOURS PRN
Status: CANCELLED | OUTPATIENT
Start: 2020-07-27

## 2020-07-27 RX ORDER — TAMSULOSIN HYDROCHLORIDE 0.4 MG/1
0.4 CAPSULE ORAL
Status: DISCONTINUED | OUTPATIENT
Start: 2020-07-28 | End: 2020-07-30

## 2020-07-27 RX ORDER — ACETAMINOPHEN 325 MG/1
650 TABLET ORAL EVERY 4 HOURS PRN
Status: DISCONTINUED | OUTPATIENT
Start: 2020-07-27 | End: 2020-07-30

## 2020-07-27 RX ORDER — BENZTROPINE MESYLATE 1 MG/1
1 TABLET ORAL EVERY 6 HOURS PRN
Status: DISCONTINUED | OUTPATIENT
Start: 2020-07-27 | End: 2020-07-31 | Stop reason: HOSPADM

## 2020-07-27 RX ORDER — ACETAMINOPHEN 325 MG/1
650 TABLET ORAL EVERY 4 HOURS PRN
Status: CANCELLED | OUTPATIENT
Start: 2020-07-27

## 2020-07-27 RX ORDER — ACETAMINOPHEN 325 MG/1
650 TABLET ORAL EVERY 6 HOURS PRN
Status: CANCELLED | OUTPATIENT
Start: 2020-07-27

## 2020-07-27 RX ORDER — LABETALOL 200 MG/1
300 TABLET, FILM COATED ORAL EVERY 8 HOURS SCHEDULED
Status: DISCONTINUED | OUTPATIENT
Start: 2020-07-27 | End: 2020-07-29

## 2020-07-27 RX ORDER — OLANZAPINE 2.5 MG/1
2.5 TABLET ORAL
Status: DISCONTINUED | OUTPATIENT
Start: 2020-07-27 | End: 2020-07-31 | Stop reason: HOSPADM

## 2020-07-27 RX ORDER — MAGNESIUM HYDROXIDE/ALUMINUM HYDROXICE/SIMETHICONE 120; 1200; 1200 MG/30ML; MG/30ML; MG/30ML
30 SUSPENSION ORAL EVERY 4 HOURS PRN
Status: DISCONTINUED | OUTPATIENT
Start: 2020-07-27 | End: 2020-07-31 | Stop reason: HOSPADM

## 2020-07-27 RX ORDER — ACETAMINOPHEN 325 MG/1
975 TABLET ORAL EVERY 6 HOURS PRN
Status: CANCELLED | OUTPATIENT
Start: 2020-07-27

## 2020-07-27 RX ORDER — POTASSIUM CHLORIDE 20 MEQ/1
40 TABLET, EXTENDED RELEASE ORAL ONCE
Status: COMPLETED | OUTPATIENT
Start: 2020-07-27 | End: 2020-07-27

## 2020-07-27 RX ORDER — DULOXETIN HYDROCHLORIDE 30 MG/1
60 CAPSULE, DELAYED RELEASE ORAL DAILY
Status: DISCONTINUED | OUTPATIENT
Start: 2020-07-28 | End: 2020-07-28

## 2020-07-27 RX ORDER — OLANZAPINE 10 MG/1
2.5 INJECTION, POWDER, LYOPHILIZED, FOR SOLUTION INTRAMUSCULAR
Status: DISCONTINUED | OUTPATIENT
Start: 2020-07-27 | End: 2020-07-31 | Stop reason: HOSPADM

## 2020-07-27 RX ORDER — OLANZAPINE 10 MG/1
2.5 INJECTION, POWDER, LYOPHILIZED, FOR SOLUTION INTRAMUSCULAR
Status: CANCELLED | OUTPATIENT
Start: 2020-07-27

## 2020-07-27 RX ORDER — LORAZEPAM 2 MG/ML
1 INJECTION INTRAMUSCULAR ONCE
Status: COMPLETED | OUTPATIENT
Start: 2020-07-27 | End: 2020-07-27

## 2020-07-27 RX ORDER — MAGNESIUM HYDROXIDE/ALUMINUM HYDROXICE/SIMETHICONE 120; 1200; 1200 MG/30ML; MG/30ML; MG/30ML
30 SUSPENSION ORAL EVERY 4 HOURS PRN
Status: CANCELLED | OUTPATIENT
Start: 2020-07-27

## 2020-07-27 RX ORDER — BENZTROPINE MESYLATE 1 MG/ML
1 INJECTION INTRAMUSCULAR; INTRAVENOUS EVERY 6 HOURS PRN
Status: CANCELLED | OUTPATIENT
Start: 2020-07-27

## 2020-07-27 RX ORDER — HYDRALAZINE HYDROCHLORIDE 20 MG/ML
10 INJECTION INTRAMUSCULAR; INTRAVENOUS EVERY 6 HOURS PRN
Status: DISCONTINUED | OUTPATIENT
Start: 2020-07-27 | End: 2020-07-27 | Stop reason: HOSPADM

## 2020-07-27 RX ORDER — LORAZEPAM 2 MG/ML
1 INJECTION INTRAMUSCULAR EVERY 6 HOURS PRN
Status: CANCELLED | OUTPATIENT
Start: 2020-07-27

## 2020-07-27 RX ORDER — HALOPERIDOL 5 MG
5 TABLET ORAL EVERY 8 HOURS PRN
Status: CANCELLED | OUTPATIENT
Start: 2020-07-27

## 2020-07-27 RX ORDER — HYDROXYZINE HYDROCHLORIDE 25 MG/1
25 TABLET, FILM COATED ORAL EVERY 6 HOURS PRN
Status: DISCONTINUED | OUTPATIENT
Start: 2020-07-27 | End: 2020-07-31 | Stop reason: HOSPADM

## 2020-07-27 RX ORDER — THIAMINE MONONITRATE (VIT B1) 100 MG
100 TABLET ORAL DAILY
Status: CANCELLED | OUTPATIENT
Start: 2020-07-28

## 2020-07-27 RX ORDER — AMLODIPINE BESYLATE 5 MG/1
5 TABLET ORAL 2 TIMES DAILY
Status: DISCONTINUED | OUTPATIENT
Start: 2020-07-28 | End: 2020-07-31 | Stop reason: HOSPADM

## 2020-07-27 RX ADMIN — HYDROXYZINE HYDROCHLORIDE 50 MG: 50 TABLET, FILM COATED ORAL at 22:02

## 2020-07-27 RX ADMIN — LAMOTRIGINE 150 MG: 100 TABLET ORAL at 08:51

## 2020-07-27 RX ADMIN — DULOXETINE HYDROCHLORIDE 60 MG: 60 CAPSULE, DELAYED RELEASE ORAL at 08:51

## 2020-07-27 RX ADMIN — AMLODIPINE BESYLATE 5 MG: 5 TABLET ORAL at 17:33

## 2020-07-27 RX ADMIN — ALUMINUM HYDROXIDE, MAGNESIUM HYDROXIDE, AND SIMETHICONE 30 ML: 200; 200; 20 SUSPENSION ORAL at 19:03

## 2020-07-27 RX ADMIN — Medication 100 MG: at 08:51

## 2020-07-27 RX ADMIN — POTASSIUM CHLORIDE 40 MEQ: 1500 TABLET, EXTENDED RELEASE ORAL at 08:51

## 2020-07-27 RX ADMIN — FOLIC ACID 1 MG: 1 TABLET ORAL at 08:51

## 2020-07-27 RX ADMIN — HYDRALAZINE HYDROCHLORIDE 10 MG: 20 INJECTION INTRAMUSCULAR; INTRAVENOUS at 18:58

## 2020-07-27 RX ADMIN — NICOTINE 7 MG: 7 PATCH TRANSDERMAL at 08:51

## 2020-07-27 RX ADMIN — ENOXAPARIN SODIUM 40 MG: 40 INJECTION SUBCUTANEOUS at 08:51

## 2020-07-27 RX ADMIN — TAMSULOSIN HYDROCHLORIDE 0.4 MG: 0.4 CAPSULE ORAL at 17:33

## 2020-07-27 RX ADMIN — LORAZEPAM 1 MG: 1 TABLET ORAL at 16:15

## 2020-07-27 RX ADMIN — Medication 1 TABLET: at 08:51

## 2020-07-27 RX ADMIN — LABETALOL HYDROCHLORIDE 300 MG: 200 TABLET, FILM COATED ORAL at 21:26

## 2020-07-27 RX ADMIN — LABETALOL HYDROCHLORIDE 200 MG: 100 TABLET, FILM COATED ORAL at 08:51

## 2020-07-27 RX ADMIN — LORAZEPAM 1 MG: 2 INJECTION INTRAMUSCULAR; INTRAVENOUS at 18:58

## 2020-07-27 RX ADMIN — AMLODIPINE BESYLATE 5 MG: 5 TABLET ORAL at 08:51

## 2020-07-27 NOTE — UTILIZATION REVIEW
Continued Stay Review    Date: 7/26/2020                         Current Patient Class:  Inpatient   Current Level of Care:  Med Surg     HPI:54 y o  female initially admitted on 7/22/2020 s/p overdose - benadryl & ETOH - suicide attempt    Assessment/Plan:  She reports feeling dizzy and lightheaded this am found to be hypertensive, Norvasc started , prn hydralazine and ativan   She also reports watery diarrhea 20 times since last PM  Urology consult for urine retention, post replaced to keep for 3 days - pt on flomax  Likely due to anticholinergic effect  Psych continue 1:1 wac  Continue lamictal , Ativanm prn Cymbaltya   To inpatient Psych when medically stable       Pertinent Labs/Diagnostic Results:   Results from last 7 days   Lab Units 07/22/20  2235   SARS-COV-2  Negative     Results from last 7 days   Lab Units 07/27/20  0546 07/25/20  0513 07/24/20  0604 07/23/20  0435 07/22/20  2211   WBC Thousand/uL 8 93 9 18 11 34* 8 73 8 45   HEMOGLOBIN g/dL 14 2 14 4 13 4 13 7 15 1   HEMATOCRIT % 43 1 43 4 40 9 40 5 44 6   PLATELETS Thousands/uL 232 213 189 225 268   NEUTROS ABS Thousands/µL  --  5 57 7 50 4 90 3 79         Results from last 7 days   Lab Units 07/27/20  0546 07/26/20  0611 07/25/20  0513 07/24/20  0604 07/23/20  0435 07/22/20  2211   SODIUM mmol/L 136 137 136 133* 139 141   POTASSIUM mmol/L 3 8 3 6 3 9 3 5 3 9 4 3   CHLORIDE mmol/L 101 103 103 101 106 105   CO2 mmol/L 27 24 24 26 21 24   ANION GAP mmol/L 8 10 9 6 12 12   BUN mg/dL 8 10 11 9 20 22   CREATININE mg/dL 0 82 0 68 0 73 0 87 0 70 0 96   EGFR ml/min/1 73sq m 82 100 94 76 99 68   CALCIUM mg/dL 8 8 8 4 8 3 7 2* 7 0* 9 0   MAGNESIUM mg/dL 1 9 1 9  --  2 5 1 9 2 4   PHOSPHORUS mg/dL  --   --   --  2 2* 2 9  --      Results from last 7 days   Lab Units 07/25/20  0513 07/24/20  0604 07/22/20  2211   AST U/L 25 23 32   ALT U/L 24 22 39   ALK PHOS U/L 116 91 94   TOTAL PROTEIN g/dL 6 3* 5 4* 6 7   ALBUMIN g/dL 3 2* 2 9* 3 9   TOTAL BILIRUBIN mg/dL 0  50 0 70 0 20   BILIRUBIN DIRECT mg/dL 0 17  --   --      Results from last 7 days   Lab Units 07/27/20  0546 07/26/20  0611 07/25/20  0513 07/24/20  0604 07/23/20  0435 07/22/20  2211   GLUCOSE RANDOM mg/dL 91 89 79 98 105 83       Results from last 7 days   Lab Units 07/23/20  0728 07/23/20  0027   PH ART  7 420 7 191*   PCO2 ART mm Hg 30 3* 53 4*   PO2 ART mm Hg 83 1 83 7   HCO3 ART mmol/L 19 2* 20 0*   BASE EXC ART mmol/L -4 0 -8 6   O2 CONTENT ART mL/dL 18 9 18 3   O2 HGB, ARTERIAL % 94 6 90 3*   ABG SOURCE  Radial, Left Radial, Left     Results from last 7 days   Lab Units 07/23/20  1409 07/22/20  2211   TROPONIN I ng/mL <0 02 <0 02       Results from last 7 days   Lab Units 07/22/20  2230   AMPH/METH  Negative   BARBITURATE UR  Negative   BENZODIAZEPINE UR  Negative   COCAINE UR  Negative   METHADONE URINE  Negative   OPIATE UR  Negative   PCP UR  Negative   THC UR  Negative     Results from last 7 days   Lab Units 07/22/20  2211   ETHANOL LVL mg/dL 218*   ACETAMINOPHEN LVL ug/mL <6 4*   SALICYLATE LVL mg/dL 3 2       Vital Signs:   07/26/20 23:13:10  97 3 °F (36 3 °C)Abnormal   71  18  151/87  108  96 %       07/26/20 2032    74    174/114Abnormal   129  97 %    Lying   07/26/20 20:22:43  97 5 °F (36 4 °C)  70    182/102Abnormal   129  96 %       07/26/20 2005            97 %       07/26/20 17:54:46  97 9 °F (36 6 °C)  72  18  186/103Abnormal   131  96 %       07/26/20 1533        194/116Abnormal         Lying   07/26/20 15:26:03  97 4 °F (36 3 °C)Abnormal   72    193/115Abnormal   141  97 %       07/26/20 14:58:13  97 9 °F (36 6 °C)  72  18  179/115Abnormal   136  95 %       07/26/20 14:27:34    73    178/118Abnormal   138  95 %       07/26/20 12:51:23  97 9 °F (36 6 °C)  71  24Abnormal   180/113Abnormal   135  96 %       07/26/20 1000    75    175/120Abnormal   138  100 %       07/26/20 08:33:36  98 2 °F (36 8 °C)  70  18  166/85  112  100 %  None (Room air)   07/26/20 03:50:07    79    166/85  112  97 %       07/26/20 01:34:43    73    165/82  110  98 %       07/26/20 00:38:08    73    190/108Abnormal    135  97 %       BP: Hospitalist aware at 07/26/20 0038   07/26/20 0000  98 1 °F (36 7 °C)  72    206/117Abnormal    147  97 %       BP: DENIS Oglesby Notified   Awaiting for new orders  at 07/26/20 0000         Medications:   Scheduled Medications:    Medications:  amLODIPine 5 mg Oral Daily   DULoxetine 60 mg Oral Daily   enoxaparin 40 mg Subcutaneous Daily   folic acid 1 mg Oral Daily   labetalol 200 mg Oral Q12H PRAMOD   lamoTRIgine 150 mg Oral Daily   multivitamin-minerals 1 tablet Oral Daily   nicotine 7 mg Transdermal Daily   tamsulosin 0 4 mg Oral Daily With Dinner   thiamine 100 mg Oral Daily   Labetalol - 10 mg iv  7/26 X 1  Labetalol 20 mg iv 7/26 x 2  Ativan 2 mg iv 7/26 x 1  K dur  40 meq oral - 7/26 x 1      Continuous IV Infusions:    lactated ringers 50 mL/hr Intravenous Continuous     PRN Meds:    acetaminophen 650 mg Oral Q6H PRN 7/26 x 3   aluminum-magnesium hydroxide-simethicone 30 mL Oral Q4H PRN    hydrALAZINE 10 mg Intravenous Q6H PRN 7/26 x 2   LORazepam 1 mg Oral Q8H PRN 7/26 x 1   ondansetron 4 mg Intravenous Q6H PRN 7/26 x 2       Discharge Plan: D     Network Utilization Review Department  [email or web address]  ATTENTION: Please call with any questions or concerns to [phone] and carefully listen to the prompts so that you are directed to the right person  All voicemails are confidential   [facility] all requests for admission clinical reviews, approved or denied determinations and any other requests to dedicated fax number below belonging to the campus where the patient is receiving treatment   List of dedicated fax numbers for the Facilities:  [address] DENIALS (Administrative/Medical Necessity) [phone]   [address] (Maternity/NICU/Pediatrics) [phone]   Lisha Manual Mena Barcenas [phone]   [facility] [phone]   [address] [place] [address] [phone]   Keren Renteria [address] [phone]   [facility]  [phone]   [address] [facility] [address]  [address] [facility] [address] [facility] [phone]

## 2020-07-27 NOTE — EMTALA/ACUTE CARE TRANSFER
[address]  Dept: [phone]      ACUTE CARE TRANSFER CONSENT    NAME Tonny Vincent 1965                              MRN [id number]    I have been informed of my rights regarding examination, treatment, and transfer   by Dr Silvina Reynoso MD    Benefits:  For inpatient psychiatric treatment    Risks:   Death       Transfer Request:  I acknowledge that my medical condition has been evaluated and explained to me by the treating physician or other qualified medical person and/or my attending physician who has recommended and offered to me further medical examination and treatment  I understand the Hospital's obligation with respect to the treatment and stabilization of my medical condition  I nevertheless request to be transferred  I release the Hospital, the doctor, and any other persons caring for me from all responsibility or liability for any injury or ill effects that may result from my transfer and agree to accept all responsibility for the consequences of my choice to transfer, rather than receive stabilizing treatment at the Hospital  I understand that because the transfer is my request, my insurance may not provide reimbursement for the services  The Hospital will assist and direct me and my family in how to make arrangements for transfer, but the hospital is not liable for any fees charged by the transport service  In spite of this understanding, I refuse to consent to further medical examination and treatment which has been offered to me, and request transfer to    I authorize the performance of emergency medical procedures and treatments upon me in both transit and upon arrival at the receiving facility  Additionally, I authorize the release of any and all medical records to the receiving facility and request they be transported with me, if possible      I authorize the performance of emergency medical procedures and treatments upon me in both transit and upon arrival at the receiving facility  Additionally, I authorize the release of any and all medical records to the receiving facility and request they be transported with me, if possible  I understand that the safest mode of transportation during a medical emergency is an ambulance and that the Hospital advocates the use of this mode of transport  Risks of traveling to the receiving facility by car, including absence of medical control, life sustaining equipment, such as oxygen, and medical personnel has been explained to me and I fully understand them  (YOLANDE CORRECT BOX BELOW)  [  ]  I consent to the stated transfer and to be transported by ambulance/helicopter  [  ]  I consent to the stated transfer, but refuse transportation by ambulance and accept full responsibility for my transportation by car  I understand the risks of non-ambulance transfers and I exonerate the Hospital and its staff from any deterioration in my condition that results from this refusal     X___________________________________________    DATE  20  TIME________  Signature of patient or legally responsible individual signing on patient behalf           RELATIONSHIP TO PATIENT_________________________          Provider Certification    NAME Edis Iraheta                                        [facility] 1965                              MRN [id number]    A medical screening exam was performed on the above named patient    Based on the examination:    Condition Necessitating Transfer suicide attempt, requiring inpatient psych treatment    Patient Condition:  Stable     Reason for Transfer:  Suicide attempt, bipolar depression    Transfer Requirements: Facility     · Space available and qualified personnel available for treatment as acknowledged by    · Agreed to accept transfer and to provide appropriate medical treatment as acknowledged by          · Appropriate medical records of the examination and treatment of the patient are provided at the time of transfer   [address] [facility], [address] _______  · Transfer will be performed by qualified personnel from    and appropriate transfer equipment as required, including the use of necessary and appropriate life support measures  Provider Certification: I have examined the patient and explained the following risks and benefits of being transferred/refusing transfer to the patient/family:         Based on these reasonable risks and benefits to the patient and/or the unborn child(noel), and based upon the information available at the time of the patients examination, I certify that the medical benefits reasonably to be expected from the provision of appropriate medical treatments at another medical facility outweigh the increasing risks, if any, to the individuals medical condition, and in the case of labor to the unborn child, from effecting the transfer      X____________________________________________ DATE 07/27/20        TIME_______      ORIGINAL - SEND TO MEDICAL RECORDS   COPY - SEND WITH PATIENT DURING TRANSFER

## 2020-07-27 NOTE — UTILIZATION REVIEW
Continued Stay Review    Date: 7/24/2020                          Current Patient Class:  Inpatient   Current Level of Care:  ICU - transfer to Med Surg 7/24    HPI:54 y o  female initially admitted on 7/22/2020 s/p suicide attempt  - benadryl OD and alcohol  Hx of bipolar depression,    Assessment/Plan:   She was extubated on 7/23 , doing well on room air    Plan transfer to med surg floor  S/p suicide attempt with a prior history of same  psych consulted  Hx of ETOH abuse  Pt continues with urinary hesitency  Continues of 1:1 behavioral health watch  Behavioral Health consult- 7/24- suicide attempt by overdose of benadryl and alcohol  Now awake and alert x 3   Plan IP psych when stable         Pertinent Labs/Diagnostic Results:   Results from last 7 days   Lab Units 07/22/20 2235   SARS-COV-2  Negative     Results from last 7 days   Lab Units 07/27/20  0546 07/25/20  0513 07/24/20  0604 07/23/20  0435 07/22/20  2211   WBC Thousand/uL 8 93 9 18 11 34* 8 73 8 45   HEMOGLOBIN g/dL 14 2 14 4 13 4 13 7 15 1   HEMATOCRIT % 43 1 43 4 40 9 40 5 44 6   PLATELETS Thousands/uL 232 213 189 225 268   NEUTROS ABS Thousands/µL  --  5 57 7 50 4 90 3 79     Results from last 7 days   Lab Units 07/27/20  0546 07/26/20  0611 07/25/20  0513 07/24/20  0604 07/23/20  0435 07/22/20  2211   SODIUM mmol/L 136 137 136 133* 139 141   POTASSIUM mmol/L 3 8 3 6 3 9 3 5 3 9 4 3   CHLORIDE mmol/L 101 103 103 101 106 105   CO2 mmol/L 27 24 24 26 21 24   ANION GAP mmol/L 8 10 9 6 12 12   BUN mg/dL 8 10 11 9 20 22   CREATININE mg/dL 0 82 0 68 0 73 0 87 0 70 0 96   EGFR ml/min/1 73sq m 82 100 94 76 99 68   CALCIUM mg/dL 8 8 8 4 8 3 7 2* 7 0* 9 0   MAGNESIUM mg/dL 1 9 1 9  --  2 5 1 9 2 4   PHOSPHORUS mg/dL  --   --   --  2 2* 2 9  --      Results from last 7 days   Lab Units 07/25/20  0513 07/24/20  0604 07/22/20  2211   AST U/L 25 23 32   ALT U/L 24 22 39   ALK PHOS U/L 116 91 94   TOTAL PROTEIN g/dL 6 3* 5 4* 6 7   ALBUMIN g/dL 3 2* 2  9* 3 9   TOTAL BILIRUBIN mg/dL 0 50 0 70 0 20   BILIRUBIN DIRECT mg/dL 0 17  --   --      Results from last 7 days   Lab Units 07/27/20  0546 07/26/20  0611 07/25/20  0513 07/24/20  0604 07/23/20  0435 07/22/20  2211   GLUCOSE RANDOM mg/dL 91 89 79 98 105 83       Results from last 7 days   Lab Units 07/23/20  0728 07/23/20  0027   PH ART  7 420 7 191*   PCO2 ART mm Hg 30 3* 53 4*   PO2 ART mm Hg 83 1 83 7   HCO3 ART mmol/L 19 2* 20 0*   BASE EXC ART mmol/L -4 0 -8 6   O2 CONTENT ART mL/dL 18 9 18 3   O2 HGB, ARTERIAL % 94 6 90 3*   ABG SOURCE  Radial, Left Radial, Left       Results from last 7 days   Lab Units 07/23/20  1409 07/22/20  2211   TROPONIN I ng/mL <0 02 <0 02       Results from last 7 days   Lab Units 07/22/20  2230   AMPH/METH  Negative   BARBITURATE UR  Negative   BENZODIAZEPINE UR  Negative   COCAINE UR  Negative   METHADONE URINE  Negative   OPIATE UR  Negative   PCP UR  Negative   THC UR  Negative     Results from last 7 days   Lab Units 07/22/20  2211   ETHANOL LVL mg/dL 218*   ACETAMINOPHEN LVL ug/mL <8 0*   SALICYLATE LVL mg/dL 3 2       Vital Signs: 7/24/2020    Medications:   Scheduled Medications:    Medications:  amLODIPine 5 mg Oral Daily   DULoxetine 60 mg Oral Daily   enoxaparin 40 mg Subcutaneous Daily   folic acid 1 mg Oral Daily   labetalol 200 mg Oral Q12H [facility]   lamoTRIgine 150 mg Oral Daily   multivitamin-minerals 1 tablet Oral Daily   nicotine 7 mg Transdermal Daily   tamsulosin 0 4 mg Oral Daily With Dinner   thiamine 100 mg Oral Daily     Continuous IV Infusions:    lactated ringers 50 mL/hr Intravenous Continuous     PRN Meds:    acetaminophen 650 mg Oral Q6H PRN   aluminum-magnesium hydroxide-simethicone 30 mL Oral Q4H PRN   hydrALAZINE 10 mg Intravenous Q6H PRN   LORazepam 1 mg Oral Q8H PRN   ondansetron 4 mg Intravenous Q6H PRN       Discharge Plan: TBD     Network Utilization Review Department  [email or web address]  ATTENTION: Please call with any questions or concerns to [phone] and carefully listen to the prompts so that you are directed to the right person  All voicemails are confidential   Azul Cespedes all requests for admission clinical reviews, approved or denied determinations and any other requests to dedicated fax number below belonging to the campus where the patient is receiving treatment   List of dedicated fax numbers for the Facilities:  [address] DENIALS (Administrative/Medical Necessity) [phone]   [address] (Maternity/NICU/Pediatrics) [phone]   Kaley Flatten [phone]   Sergio Causey [phone]   María Vera [phone]   Blaise Dennis [phone]   [address] [place] [address] [phone]   [facility]  [phone]   [address] [facility] [address]  [facility] [address] [facility] [phone]

## 2020-07-27 NOTE — PROGRESS NOTES
Patient examined spoke with the nurse and discussed with nurse practitioner Rosa Hoyos she reported that patient is medically clear I called the crisis worker Rach Giang and he is fully aware that patient is medically clear and patient prefers to go to [facility] for inpatient psychiatric care  Patient is happy to see me and she also is aware that she is medically clear and she will be transfer to inpatient psychiatric care for further treatment and stabilization  Patient is started on Cymbalta and she is already on Lamictal and Ativan p r n  Phillip Lobo Patient has trouble in sleeping and she needed a stat dose of Ativan last night  Patient is not lethargic  No shakes  Patient regrets her suicide attempt and she wants to get better  She is able to express her feelings about ongoing family stressors  Patient remain on one-to-one suicidal precaution  Nurse reports no behavior problem  Patient offers no new complaints  I will continue one-to-one suicidal precaution until she is being transferred to inpatient psychiatric care for further treatment and stabilization  Therapy done with good response  Continue her medications as ordered

## 2020-07-27 NOTE — UTILIZATION REVIEW
Continued Stay Review    Date: 7/27/2020                          Current Patient Class:  Inpatient   Current Level of Care:  Med Surg     HPI:54 y o  female initially admitted on 7/22/2020 s/p overdose  Benadryl and ETOH  Assessment/Plan: Huffman cath removed on 7/26 po pt voiding independently  1: 1 suicide watch continues  VSS  Pt now medically stable Bed search  For inpateitn psych bed       Pertinent Labs/Diagnostic Results:   Results from last 7 days   Lab Units 07/22/20 2235   SARS-COV-2  Negative     Results from last 7 days   Lab Units 07/27/20  0546 07/25/20  0513 07/24/20  0604 07/23/20  0435 07/22/20  2211   WBC Thousand/uL 8 93 9 18 11 34* 8 73 8 45   HEMOGLOBIN g/dL 14 2 14 4 13 4 13 7 15 1   HEMATOCRIT % 43 1 43 4 40 9 40 5 44 6   PLATELETS Thousands/uL 232 213 189 225 268   NEUTROS ABS Thousands/µL  --  5 57 7 50 4 90 3 79         Results from last 7 days   Lab Units 07/27/20  0546 07/26/20  0611 07/25/20  0513 07/24/20  0604 07/23/20  0435 07/22/20  2211   SODIUM mmol/L 136 137 136 133* 139 141   POTASSIUM mmol/L 3 8 3 6 3 9 3 5 3 9 4 3   CHLORIDE mmol/L 101 103 103 101 106 105   CO2 mmol/L 27 24 24 26 21 24   ANION GAP mmol/L 8 10 9 6 12 12   BUN mg/dL 8 10 11 9 20 22   CREATININE mg/dL 0 82 0 68 0 73 0 87 0 70 0 96   EGFR ml/min/1 73sq m 82 100 94 76 99 68   CALCIUM mg/dL 8 8 8 4 8 3 7 2* 7 0* 9 0   MAGNESIUM mg/dL 1 9 1 9  --  2 5 1 9 2 4   PHOSPHORUS mg/dL  --   --   --  2 2* 2 9  --      Results from last 7 days   Lab Units 07/25/20  0513 07/24/20  0604 07/22/20  2211   AST U/L 25 23 32   ALT U/L 24 22 39   ALK PHOS U/L 116 91 94   TOTAL PROTEIN g/dL 6 3* 5 4* 6 7   ALBUMIN g/dL 3 2* 2 9* 3 9   TOTAL BILIRUBIN mg/dL 0 50 0 70 0 20   BILIRUBIN DIRECT mg/dL 0 17  --   --      Results from last 7 days   Lab Units 07/27/20  0546 07/26/20  0611 07/25/20  0513 07/24/20  0604 07/23/20  0435 07/22/20  2211   GLUCOSE RANDOM mg/dL 91 89 79 98 105 83        Results from last 7 days   Lab Units 07/23/20  0728 07/23/20  0027   PH ART  7 420 7 191*   PCO2 ART mm Hg 30 3* 53 4*   PO2 ART mm Hg 83 1 83 7   HCO3 ART mmol/L 19 2* 20 0*   BASE EXC ART mmol/L -4 0 -8 6   O2 CONTENT ART mL/dL 18 9 18 3   O2 HGB, ARTERIAL % 94 6 90 3*   ABG SOURCE  Radial, Left Radial, Left     Results from last 7 days   Lab Units 07/23/20  1409 07/22/20  2211   TROPONIN I ng/mL <0 02 <0 02     Results from last 7 days   Lab Units 07/22/20  2230   AMPH/METH  Negative   BARBITURATE UR  Negative   BENZODIAZEPINE UR  Negative   COCAINE UR  Negative   METHADONE URINE  Negative   OPIATE UR  Negative   PCP UR  Negative   THC UR  Negative     Results from last 7 days   Lab Units 07/22/20  2211   ETHANOL LVL mg/dL 218*   ACETAMINOPHEN LVL ug/mL <9 8*   SALICYLATE LVL mg/dL 3 2       Vital Signs:   Date/Time  Temp  Pulse  Resp  BP  MAP (mmHg)  SpO2  O2 Device  Patient Position - Orthostatic VS   07/27/20 07:30:38  98 °F (36 7 °C)  71  18  145/82  103  97 %       07/27/20 05:33:10    73    148/81  103  96 %               Medications:   Scheduled Medications:    Medications:  amLODIPine 5 mg Oral Daily   DULoxetine 60 mg Oral Daily   enoxaparin 40 mg Subcutaneous Daily   folic acid 1 mg Oral Daily   labetalol 200 mg Oral Q12H PRAMOD   lamoTRIgine 150 mg Oral Daily   multivitamin-minerals 1 tablet Oral Daily   nicotine 7 mg Transdermal Daily   tamsulosin 0 4 mg Oral Daily With Dinner   thiamine 100 mg Oral Daily     Continuous IV Infusions:    lactated ringers 50 mL/hr Intravenous Continuous     PRN Meds:    acetaminophen 650 mg Oral Q6H PRN    aluminum-magnesium hydroxide-simethicone 30 mL Oral Q4H PRN    hydrALAZINE 10 mg Intravenous Q6H PRN    LORazepam 1 mg Oral Q8H PRN    ondansetron 4 mg Intravenous Q6H PRN        Discharge Plan: TBD     Network Utilization Review Department  [email or web address]  ATTENTION: Please call with any questions or concerns to [phone] and carefully listen to the prompts so that you are directed to the right person  All voicemails are confidential   Farrel Bodily all requests for admission clinical reviews, approved or denied determinations and any other requests to dedicated fax number below belonging to the campus where the patient is receiving treatment   List of dedicated fax numbers for the Facilities:  [address] DENIALS (Administrative/Medical Necessity) [phone]   [address] (Maternity/NICU/Pediatrics) [phone]   Ashia Martínez [phone]   Jada Harrington [phone]   Yen Linder [phone]   Lizzie Keen [phone]   [address] [place] [address] [phone]   [facility]  [phone]   [address] [facility]  [facility] [address] [facility] [phone]

## 2020-07-27 NOTE — DISCHARGE SUMMARY
Discharge- Lucio Early 1965, 49 y o  female MRN: [id number]    Unit/Bed#: [address] [place] [address] Encounter: [id number]    Primary Care Provider: No primary care provider on file  Date and time admitted to hospital: 7/22/2020  9:50 PM        * Anticholinergic drug overdose  Assessment & Plan  Attempted suicide by drinking a bottle of wine and ingesting at least 15 tabs of Benadryl 25 mg   Brought in by   Agitated/confused on arrival  UDS negative  Tylenol level negative  Given 2 mg of Ativan on arrival and intubated for airway protection  Extubated on 7/23 and satting well air currently  Seen by psych, appreciate input  Recommend inpatient psych once medically cleared, continue one-to-one  Patient is medically cleared for inpatient psych  Discharge patient to inpatient psych today  Acute respiratory failure with hypoxia (HCC)resolved as of 7/27/2020  Assessment & Plan  Extubated without incident  Satting well on room air  Encourage IS    Alcoholic intoxication with complication ([facility] [address] )  Assessment & Plan  Alcohol level 218 on admission  Continue CIWA protocol  Continue folic acid/MVI/thiamine  Alcohol cessation      Elevated blood pressure reading  Assessment & Plan  Secondary to anticholinergic overdose  Received nicardipine drip in ICU  BP elevated yesterday  Started Norvasc and labetalol  Hold for SBP < 130  Continue hydralazine p o  And Ativan p r n  Improving  Continue Norvasc, labral, hydralazine p r n  And Ativan p r n  On discharge  Continue to monitor BP in inpatient psych  Acute urinary retention  Assessment & Plan  Patient had Huffman in ICU  Failed voiding trial  Required straight cath x2 yielded 1200ml,1000ml each time  Likely secondary to anticholinergic effect  Creatinine normal   Huffman inserted, removed yesterday  Voiding well overnight  PVR was 0 last night    Continue Flomax  Consulted urology, appreciate input  Follow-up urology post discharge from inpatient psych  Bipolar depression ([facility] )  Assessment & Plan  Continue Lamictal   Hold Cymbalta  Psych following appreciate input  Obese  Assessment & Plan  Body mass index is 33 55 kg/m²  Diet and lifestyle modification        Discharging Physician / Practitioner: KYLE Neff  PCP: No primary care provider on file  Admission Date: 7/22/2020  Discharge Date: 07/27/20    Reason for Admission: Overdose - Intentional (told  she texted father and told him she was going to kill herself  having prob with son  took normal bedtime meds   has been drinking wine all day and told  she took about 18 benadryl)        Resolved Problems  Date Reviewed: 7/27/2020          Resolved    Respiratory failure, acute hypoxic/hypercapnic ([place] [address] ) 7/24/2020     Resolved by  Bea Andrade PA-C    Overdose, intentional self-harm, initial encounter ([place] [address] ) 7/25/2020     Resolved by  KYLE Neff    Prolongation of QRS complex on electrocardiography 7/24/2020     Resolved by  Bea Andrade PA-C    Acute respiratory failure with hypoxia ([place] [address] ) 7/27/2020     Resolved by  Ej Schmidt, [address] [facility] Stay:  IP CONSULT TO CASE MANAGEMENT  IP CONSULT TO TOXICOLOGY  IP CONSULT TO PSYCHIATRY  IP CONSULT TO UROLOGY  IP CONSULT TO ED CRISIS WORKER    Procedures Performed:     · none    Significant Findings / Test Results:     · none  Results from last 7 days   Lab Units 07/27/20  0546 07/25/20  0513 07/24/20  0604   WBC Thousand/uL 8 93 9 18 11 34*   HEMOGLOBIN g/dL 14 2 14 4 13 4   PLATELETS Thousands/uL 232 213 189     Results from last 7 days   Lab Units 07/27/20  0546 07/26/20  0611 07/25/20  0513 07/24/20  0604  07/22/20  2211   SODIUM mmol/L 136 137 136 133*   < > 141   POTASSIUM mmol/L 3 8 3 6 3 9 3 5   < > 4 3   CHLORIDE mmol/L 101 103 103 101   < > 105   CO2 mmol/L 27 24 24 26   < > 24   BUN mg/dL 8 10 11 9   < > 22   CREATININE mg/dL 0 82 0 68 0 73 0 87   < > 0 96   CALCIUM mg/dL 8 8 8 4 8 3 7 2*   < > 9 0   TOTAL BILIRUBIN mg/dL  --   --  0 50 0 70  --  0 20   ALK PHOS U/L  --   --  116 91  --  94   ALT U/L  --   --  24 22  --  39   AST U/L  --   --  25 23  --  32    < > = values in this interval not displayed  Results from last 7 days   Lab Units 07/23/20  1409 07/22/20  2211   TROPONIN I ng/mL <0 02 <0 02     No results found for: HGBA1C          Blood Culture: No results found for: BLOODCX  Urine Culture: No results found for: URINECX  Sputum Culture: No components found for: SPUTUMCX  Wound Culture: No results found for: [facility]      Imaging  XR chest portable   Final Result by Yanni Peña MD (07/27 0194)      No radiographic evidence of acute intrathoracic process  Workstation performed: [id number]         XR chest 1 view portable   ED Interpretation by Bee Horan DO (07/23 0035)   ETT above césar  Final Result by Uche Andrade MD (07/23 3470)      Questionable right middle lobe infiltrate  No pneumothorax or pleural effusion  Findings concur with the preliminary report by the referring clinician already in PACS and/or our electronic record EPIC  Workstation performed: [id number]         XR chest 1 view portable   Final Result by Shonda Pimentel DO (07/23 5158)      Low lung volumes  Moderate-sized patchy opacity suspected in the right mid and lower lung field which could represent infection, aspiration, and/or edema depending on the clinical setting  Lungs otherwise appear grossly clear  Other findings as above  Workstation performed: [id number]               Incidental Findings:   · none     Test Results Pending at Discharge (will require follow up):    · None     Outpatient Tests Requested:  · None    Complications:  None    Reason for Admission:  Benadryl overdose    Hospital Course:     PER HPI: Harvey Mckay is a 49 y o  female patient with a PMH of bipolar depression, nicotine abuse, alcohol abuse who originally presented to the hospital on 7/22/2020 due to suicide attempt by drinking bottle of wine and ingesting at least 15 tabs of Benadryl 25 mg  Patient was agitated and confused on arrival   Given 2 mg of Ativan and intubated for airway protection  Patient was extubated next day and satting well on room currently  Patient had elevated BP on admission due to anticholinergic effect, received nicardipine drip in ICU  Patient started on labetalol, Norvasc for hypertension  BP labile currently  Patient failed voiding trial initially  Was started on p o  Flomax  Huffman discontinued yesterday, patient voiding well overnight  PVR was 0  Patient was seen by Urology, recommend to continue Flomax on discharge and follow-up in office post inpatient psych treatment  Followed [facility] protocol for possible alcohol withdrawal   Will continue folic acid/MVI/thiamine on discharge  Patient was seen by psych, recommend inpatient psych once medically cleared  Patient will be discharged to inpatient psych voluntarily today  Hospital course:   Please see above list of diagnoses and related plan for additional information  Condition at Discharge: good       Discharge instructions/Information to patient and family:   See after visit summary for information provided to patient and family  Provisions for Follow-Up Care:  See after visit summary for information related to follow-up care and any pertinent home health orders  Disposition:     Inpatient Psychiatry at [facility]  Planned Readmission: no     Discharge Statement:  I spent 35 minutes discharging the patient  This time was spent on the day of discharge  I had direct contact with the patient on the day of discharge  Greater than 50% of the total time was spent examining patient, answering all patient questions, arranging and discussing plan of care with patient as well as directly providing post-discharge instructions    Additional time then spent on discharge activities  Discharge Medications:  See after visit summary for reconciled discharge medications provided to patient and family        ** Please Note: This note has been constructed using a voice recognition system **

## 2020-07-27 NOTE — ED NOTES
[facility] notified of medical clearance @ 11:00 - patient requested [facility]   201 signed by patient and faxed to [facility] about 11:10 - original placed in envelope on patient's chart  Fax not received - re-faxed about 12:25  [facility] asked if patient can urinate on her own @ 14:40 - per her Anjel Simonchau - yes; [facility] notified  [facility] is [facility]    Patient is accepted by  by Elizabeth Yan, 10 Vicente Vazquez per [address] is arranged with [facility]    Transportation is scheduled for 19:00 via [facility]  Patient may go to the floor at upon arrival    Nurse report is to be called to [phone] prior to patient transfer  Insurance Authorization for admission:   Phone call placed to [facility]  Phone number: [phone] Julieta Ramirez said to call - dedicated # [phone]  Spoke to Makenzie Mayen - another care mgr will need to call back  - no-one called back - called again @ 17:30 - spoke with Karyna WISE - the case has to be assigned to a Care Mgr - this typically takes 24-48 hours - then the precert will be completed  ** days approved  Level of care: uinknown  Review on unknown  Authorization # [id number]      Insurance Authorization for Transportation:    Phone call placed to same as above  Phone number **  Spoke to **     Authorization #: [id number]

## 2020-07-27 NOTE — UTILIZATION REVIEW
Continued Stay Review    Date: 7/25/2020                          Current Patient Class:  inpatient   Current Level of Care:  Med Surg     HPI:54 y o  female initially admitted on 7/22 s/p suicide attempt -  Benadryl overdose  and alcohol( wine)    Assessment/Plan:  Plan to inpatient psych when medically cleared  Pt with  Straight cath this am  Yielded 1200 ml  Last night 1000 ml  Pt continues with urinary hesitance, retention since removal of the post cath       Pertinent Labs/Diagnostic Results:   Results from last 7 days   Lab Units 07/22/20  2235   SARS-COV-2  Negative     Results from last 7 days   Lab Units 07/27/20  0546 07/25/20  0513 07/24/20  0604 07/23/20  0435 07/22/20  2211   WBC Thousand/uL 8 93 9 18 11 34* 8 73 8 45   HEMOGLOBIN g/dL 14 2 14 4 13 4 13 7 15 1   HEMATOCRIT % 43 1 43 4 40 9 40 5 44 6   PLATELETS Thousands/uL 232 213 189 225 268   NEUTROS ABS Thousands/µL  --  5 57 7 50 4 90 3 79         Results from last 7 days   Lab Units 07/27/20  0546 07/26/20  0611 07/25/20  0513 07/24/20  0604 07/23/20  0435 07/22/20  2211   SODIUM mmol/L 136 137 136 133* 139 141   POTASSIUM mmol/L 3 8 3 6 3 9 3 5 3 9 4 3   CHLORIDE mmol/L 101 103 103 101 106 105   CO2 mmol/L 27 24 24 26 21 24   ANION GAP mmol/L 8 10 9 6 12 12   BUN mg/dL 8 10 11 9 20 22   CREATININE mg/dL 0 82 0 68 0 73 0 87 0 70 0 96   EGFR ml/min/1 73sq m 82 100 94 76 99 68   CALCIUM mg/dL 8 8 8 4 8 3 7 2* 7 0* 9 0   MAGNESIUM mg/dL 1 9 1 9  --  2 5 1 9 2 4   PHOSPHORUS mg/dL  --   --   --  2 2* 2 9  --      Results from last 7 days   Lab Units 07/25/20  0513 07/24/20  0604 07/22/20  2211   AST U/L 25 23 32   ALT U/L 24 22 39   ALK PHOS U/L 116 91 94   TOTAL PROTEIN g/dL 6 3* 5 4* 6 7   ALBUMIN g/dL 3 2* 2 9* 3 9   TOTAL BILIRUBIN mg/dL 0 50 0 70 0 20   BILIRUBIN DIRECT mg/dL 0 17  --   --          Results from last 7 days   Lab Units 07/27/20  0546 07/26/20  0611 07/25/20  0513 07/24/20  0604 07/23/20  0435 07/22/20  2211   GLUCOSE RANDOM mg/dL 91 89 79 98 105 83     Results from last 7 days   Lab Units 07/23/20  0728 07/23/20  0027   PH ART  7 420 7 191*   PCO2 ART mm Hg 30 3* 53 4*   PO2 ART mm Hg 83 1 83 7   HCO3 ART mmol/L 19 2* 20 0*   BASE EXC ART mmol/L -4 0 -8 6   O2 CONTENT ART mL/dL 18 9 18 3   O2 HGB, ARTERIAL % 94 6 90 3*   ABG SOURCE  Radial, Left Radial, Left       Results from last 7 days   Lab Units 07/23/20  1409 07/22/20  2211   TROPONIN I ng/mL <0 02 <0 02     Results from last 7 days   Lab Units 07/22/20  2230   AMPH/METH  Negative   BARBITURATE UR  Negative   BENZODIAZEPINE UR  Negative   COCAINE UR  Negative   METHADONE URINE  Negative   OPIATE UR  Negative   PCP UR  Negative   THC UR  Negative     Results from last 7 days   Lab Units 07/22/20  2211   ETHANOL LVL mg/dL 218*   ACETAMINOPHEN LVL ug/mL <1 0*   SALICYLATE LVL mg/dL 3 2     Vital Signs:   07/26/20 23:13:10  97 3 °F (36 3 °C)Abnormal   71  18  151/87  108  96 %       07/26/20 2032    74    174/114Abnormal   129  97 %    Lying   07/26/20 20:22:43  97 5 °F (36 4 °C)  70    182/102Abnormal   129  96 %       07/26/20 2005            97 %       07/26/20 17:54:46  97 9 °F (36 6 °C)  72  18  186/103Abnormal   131  96 %       07/26/20 1533        194/116Abnormal         Lying   07/26/20 15:26:03  97 4 °F (36 3 °C)Abnormal   72    193/115Abnormal   141  97 %       07/26/20 14:58:13  97 9 °F (36 6 °C)  72  18  179/115Abnormal   136  95 %       07/26/20 14:27:34    73    178/118Abnormal   138  95 %       07/26/20 12:51:23  97 9 °F (36 6 °C)  71  24Abnormal   180/113Abnormal   135  96 %       07/26/20 1000    75    175/120Abnormal   138  100 %       07/26/20 08:33:36  98 2 °F (36 8 °C)  70  18  166/85  112  100 %  None (Room air)     07/26/20 03:50:07    79    166/85  112  97 %       07/26/20 01:34:43    73    165/82  110  98 %       07/26/20 00:38:08    73    190/108Abnormal    135  97 %       BP: Hospitalist aware at 07/26/20 0038   07/26/20 0000  98 1 °F (36 7 °C)  72    206/117Abnormal    147  97 %       BP: C  Mendel Notified   Awaiting for new orders  at 07/26/20 0000         Medications:   Scheduled Medications:    Medications:  enoxaparin 40 mg Subcutaneous Daily   folic acid 1 mg Oral Daily   labetalol 200 mg Oral Q12H PRAMOD   lamoTRIgine 150 mg Oral Daily   multivitamin-minerals 1 tablet Oral Daily   nicotine 7 mg Transdermal Daily   tamsulosin 0 4 mg Oral Daily With Dinner   thiamine 100 mg Oral Daily   Kdur 20 meq - Oral - once - 7/25 x 1       Continuous IV Infusions:    lactated ringers 50 mL/hr Intravenous Continuous     PRN Meds:    acetaminophen 650 mg Oral Q6H PRN    aluminum-magnesium hydroxide-simethicone 30 mL Oral Q4H PRN    hydrALAZINE 10 mg Intravenous Q6H PRN 7/25 x 1   LORazepam 1 mg Oral Q8H PRN 7/25 x 1   ondansetron 4 mg Intravenous Q6H PRN        Discharge Plan:  TBD     Network Utilization Review Department  [email or web address]  ATTENTION: Please call with any questions or concerns to [phone] and carefully listen to the prompts so that you are directed to the right person  All voicemails are confidential   Jamal Sullivan all requests for admission clinical reviews, approved or denied determinations and any other requests to dedicated fax number below belonging to the campus where the patient is receiving treatment   List of dedicated fax numbers for the Facilities:  FACILITY NAME UR FAX NUMBER   ADMISSION DENIALS (Administrative/Medical Necessity) [phone]   [address]  (Maternity/NICU/Pediatrics) [phone]   Hubbell Boots [phone]   Ladean Ros [phone]   Jonathan Briones [phone]   Patrick Dayiln [place] [address] [facility] Niesha Estação  [address] [facility] [phone]   [address] [place] [address] [phone]

## 2020-07-27 NOTE — PROGRESS NOTES
Progress Note - Giuliana Houston 1965, 49 y o  female MRN: [id number]    Unit/Bed#: [address] [place] [address] Encounter: [id number]    Primary Care Provider: No primary care provider on file  Date and time admitted to hospital: 7/22/2020  9:50 PM        * Anticholinergic drug overdose  Assessment & Plan  Attempted suicide by drinking a bottle of wine and ingesting at least 15 tabs of Benadryl 25 mg   Brought in by   Agitated/confused on arrival  UDS negative  Tylenol level negative  Given 2 mg of Ativan on arrival and intubated for airway protection  Extubated on 7/23 and satting well air currently  Seen by psych, appreciate input  Recommend inpatient psych once medically cleared, continue one-to-one  Patient is medically cleared for inpatient psych  Consulted crisis  Acute respiratory failure with hypoxia (HCC)  Assessment & Plan  Extubated without incident  Satting well on room air  Encourage IS    Alcoholic intoxication with complication ([facility] [address] )  Assessment & Plan  Alcohol level 218 on admission  Continue CIWA protocol  Continue folic acid/MVI/thiamine  Alcohol cessation      Elevated blood pressure reading  Assessment & Plan  Secondary to anticholinergic overdose  Received nicardipine drip in ICU  BP elevated yesterday  Started Norvasc and labetalol  Hold for SBP < 130  Continue hydralazine p o  And Ativan p r n  Improving  Acute urinary retention  Assessment & Plan  Patient had Huffman in ICU  Failed voiding trial  Required straight cath x2 yielded 1200ml,1000ml each time  Likely secondary to anticholinergic effect  Creatinine normal   Huffman inserted, removed yesterday  Voiding well overnight  PVR was 0 last night  Continue Flomax  Consulted urology, appreciate input      Bipolar depression ([facility] [address] )  Assessment & Plan  Continue Lamictal   Hold Cymbalta  Psych following appreciate input  Obese  Assessment & Plan  Body mass index is 33 55 kg/m²     Diet and lifestyle modification          VTE Pharmacologic Prophylaxis:   Pharmacologic: Enoxaparin (Lovenox)  Mechanical VTE Prophylaxis in Place: Yes    Patient Centered Rounds: I have performed bedside rounds with nursing staff today  Discussions with Specialists or Other Care Team Provider: attending    Education and Discussions with Family / Patient: yes    Time Spent for Care: 20 minutes  More than 50% of total time spent on counseling and coordination of care as described above  Current Length of Stay: 5 day(s)    Current Patient Status: Inpatient   Certification Statement: The patient will continue to require additional inpatient hospital stay due to Anticholinergic overdose, suicide attempt    Discharge Plan:  Inpatient psych once bed available    Code Status: Level 1 - Full Code      Subjective:   Patient denies headache, dizziness, SOB, nausea, vomiting, fever, chills  Reports 2 BM overnight  Reports feeling better  Objective:     Vitals:   Temp (24hrs), Av 7 °F (36 5 °C), Min:97 3 °F (36 3 °C), Max:98 °F (36 7 °C)    Temp:  [97 3 °F (36 3 °C)-98 °F (36 7 °C)] 97 6 °F (36 4 °C)  HR:  [70-74] 72  Resp:  [18] 18  BP: (145-186)/() 174/108  SpO2:  [95 %-97 %] 95 %  Body mass index is 33 55 kg/m²  Input and Output Summary (last 24 hours): Intake/Output Summary (Last 24 hours) at 2020 1605  Last data filed at 2020 1439  Gross per 24 hour   Intake 1640 ml   Output 1150 ml   Net 490 ml       Physical Exam:     Physical Exam   Constitutional: She is oriented to person, place, and time  She appears well-developed and well-nourished  HENT:   Head: Normocephalic and atraumatic  Neck: Normal range of motion  Neck supple  No JVD present  No tracheal deviation present  No thyromegaly present  Cardiovascular: Normal rate and regular rhythm  No murmur heard  Pulmonary/Chest: Effort normal and breath sounds normal  No respiratory distress  She has no wheezes  She has no rales     Abdominal: Soft  Bowel sounds are normal  She exhibits no distension  There is no tenderness  There is no guarding  Musculoskeletal: She exhibits no edema, tenderness or deformity  Neurological: She is alert and oriented to person, place, and time  Skin: Skin is warm and dry  Psychiatric: She has a normal mood and affect  Judgment normal    Nursing note and vitals reviewed  Additional Data:     Labs:    Results from last 7 days   Lab Units 07/27/20  0546 07/25/20  0513   WBC Thousand/uL 8 93 9 18   HEMOGLOBIN g/dL 14 2 14 4   HEMATOCRIT % 43 1 43 4   PLATELETS Thousands/uL 232 213   NEUTROS PCT %  --  61   LYMPHS PCT %  --  25   MONOS PCT %  --  7   EOS PCT %  --  7*     Results from last 7 days   Lab Units 07/27/20  0546  07/25/20  0513   POTASSIUM mmol/L 3 8   < > 3 9   CHLORIDE mmol/L 101   < > 103   CO2 mmol/L 27   < > 24   BUN mg/dL 8   < > 11   CREATININE mg/dL 0 82   < > 0 73   CALCIUM mg/dL 8 8   < > 8 3   ALK PHOS U/L  --   --  116   ALT U/L  --   --  24   AST U/L  --   --  25    < > = values in this interval not displayed  * I Have Reviewed All Lab Data Listed Above  * Additional Pertinent Lab Tests Reviewed:  Dave [address] Admission Reviewed    Imaging:    Imaging Reports Reviewed Today Include: none  Imaging Personally Reviewed by Myself Includes:  none    Recent Cultures (last 7 days):           Last 24 Hours Medication List:     Current Facility-Administered Medications:  acetaminophen 650 mg Oral Q6H PRN KYLE Owens   aluminum-magnesium hydroxide-simethicone 30 mL Oral Q4H PRN KYLE Owens   [START ON 7/28/2020] amLODIPine 5 mg Oral Daily KYLE Owens   DULoxetine 60 mg Oral Daily Aster Tello MD   enoxaparin 40 mg Subcutaneous Daily Jovani Marie, DO   folic acid 1 mg Oral Daily Jovani Marie, DO   hydrALAZINE 10 mg Intravenous Q6H PRN KYLE Owens   labetalol 200 mg Oral Q12H [facility] KYLE Owens   lamoTRIgine 150 mg Oral Daily Jovani Lopez DO   LORazepam 1 mg Oral Q8H PRN KYLE Owens   multivitamin-minerals 1 tablet Oral Daily Jovani Marie,    nicotine 7 mg Transdermal Daily Jovani Marie,    ondansetron 4 mg Intravenous Q6H PRN KYLE Owens   tamsulosin 0 4 mg Oral Daily With KYLE Stewart   thiamine 100 mg Oral Daily Jovani Andrade DO        Today, Patient Was Seen By: Rik Gaucher, CRNP    ** Please Note: Dragon 360 Dictation voice to text software may have been used in the creation of this document   **

## 2020-07-27 NOTE — PLAN OF CARE
Problem: Potential for Falls  Goal: Patient will remain free of falls  Description  INTERVENTIONS:  - Assess patient frequently for physical needs  -  Identify cognitive and physical deficits and behaviors that affect risk of falls  -  [place] fall precautions as indicated by assessment   - Educate patient/family on patient safety including physical limitations  - Instruct patient to call for assistance with activity based on assessment  - Modify environment to reduce risk of injury  - Consider OT/PT consult to assist with strengthening/mobility  7/26/2020 2030 by Bhargav Apple RN  Outcome: Progressing  7/26/2020 2023 by Bhargav Apple RN  Outcome: Progressing     Problem: RESPIRATORY - ADULT  Goal: Achieves optimal ventilation and oxygenation  Description  INTERVENTIONS:  - Assess for changes in respiratory status  - Assess for changes in mentation and behavior  - Position to facilitate oxygenation and minimize respiratory effort  - Oxygen administered by appropriate delivery if ordered  - Initiate smoking cessation education as indicated  - Encourage broncho-pulmonary hygiene including cough, deep breathe, Incentive Spirometry  - Assess the need for suctioning and aspirate as needed  - Assess and instruct to report SOB or any respiratory difficulty  - Respiratory Therapy support as indicated  7/26/2020 2030 by Bhargav Apple RN  Outcome: Progressing  7/26/2020 2023 by Bhargav Apple RN  Outcome: Progressing     Problem: Nutrition/Hydration-ADULT  Goal: Nutrient/Hydration intake appropriate for improving, restoring or maintaining nutritional needs  Description  Monitor and assess patient's nutrition/hydration status for malnutrition  Collaborate with interdisciplinary team and initiate plan and interventions as ordered  Monitor patient's weight and dietary intake as ordered or per policy  Utilize nutrition screening tool and intervene as necessary   Determine patient's food preferences and provide high-protein, high-caloric foods as appropriate  INTERVENTIONS:  - Monitor oral intake, urinary output, labs, and treatment plans  - Assess nutrition and hydration status and recommend course of action  - Evaluate amount of meals eaten  - Assist patient with eating if necessary   - Allow adequate time for meals  - Recommend/ encourage appropriate diets, oral nutritional supplements, and vitamin/mineral supplements  - Order, calculate, and assess calorie counts as needed  - Recommend, monitor, and adjust tube feedings and TPN/PPN based on assessed needs  - Assess need for intravenous fluids  - Provide specific nutrition/hydration education as appropriate  - Include patient/family/caregiver in decisions related to nutrition  7/26/2020 2030 by Moriah Huitron RN  Outcome: Progressing  7/26/2020 2023 by Moriah Huitron RN  Outcome: Progressing     Problem: SAFETY ADULT  Goal: Patient will remain free of falls  Description  INTERVENTIONS:  - Assess patient frequently for physical needs  -  Identify cognitive and physical deficits and behaviors that affect risk of falls    -  [place] fall precautions as indicated by assessment   - Educate patient/family on patient safety including physical limitations  - Instruct patient to call for assistance with activity based on assessment  - Modify environment to reduce risk of injury  - Consider OT/PT consult to assist with strengthening/mobility  7/26/2020 2030 by Moriah Huitron RN  Outcome: Progressing  7/26/2020 2023 by Moriah Huitron RN  Outcome: Progressing  Goal: Maintain or return to baseline ADL function  Description  INTERVENTIONS:  -  Assess patient's ability to carry out ADLs; assess patient's baseline for ADL function and identify physical deficits which impact ability to perform ADLs (bathing, care of mouth/teeth, toileting, grooming, dressing, etc )  - Assess/evaluate cause of self-care deficits   - Assess range of motion  - Assess patient's mobility; develop plan if impaired  - Assess patient's need for assistive devices and provide as appropriate  - Encourage maximum independence but intervene and supervise when necessary  - Involve family in performance of ADLs  - Assess for home care needs following discharge   - Consider OT consult to assist with ADL evaluation and planning for discharge  - Provide patient education as appropriate  7/26/2020 2030 by Virginia Montes RN  Outcome: Progressing  7/26/2020 2023 by Virginia Montes RN  Outcome: Progressing  Goal: Maintain or return mobility status to optimal level  Description  INTERVENTIONS:  - Assess patient's baseline mobility status (ambulation, transfers, stairs, etc )    - Identify cognitive and physical deficits and behaviors that affect mobility  - Identify mobility aids required to assist with transfers and/or ambulation (gait belt, sit-to-stand, lift, walker, cane, etc )  - [place] fall precautions as indicated by assessment  - Record patient progress and toleration of activity level on Mobility SBAR; progress patient to next Phase/Stage  - Instruct patient to call for assistance with activity based on assessment  - Consider rehabilitation consult to assist with strengthening/weightbearing, etc   7/26/2020 2030 by Virginia Montes RN  Outcome: Progressing  7/26/2020 2023 by Virginia Montes RN  Outcome: Progressing     Problem: DISCHARGE PLANNING  Goal: Discharge to home or other facility with appropriate resources  Description  INTERVENTIONS:  - Identify barriers to discharge w/patient and caregiver  - Arrange for needed discharge resources and transportation as appropriate  - Identify discharge learning needs (meds, wound care, etc )  - Arrange for interpretive services to assist at discharge as needed  - Refer to Case Management Department for coordinating discharge planning if the patient needs post-hospital services based on physician/advanced practitioner order or complex needs related to functional status, cognitive ability, or social support system  7/26/2020 2030 by Vivienne Woo RN  Outcome: Progressing  7/26/2020 2023 by Vivienne Woo RN  Outcome: Progressing     Problem: Knowledge Deficit  Goal: Patient/family/caregiver demonstrates understanding of disease process, treatment plan, medications, and discharge instructions  Description  Complete learning assessment and assess knowledge base    Interventions:  - Provide teaching at level of understanding  - Provide teaching via preferred learning methods  7/26/2020 2030 by Vivienne Woo RN  Outcome: Progressing  7/26/2020 2023 by Vivienne Woo RN  Outcome: Progressing     Problem: CARDIOVASCULAR - ADULT  Goal: Maintains optimal cardiac output and hemodynamic stability  Description  INTERVENTIONS:  - Monitor I/O, vital signs and rhythm  - Monitor for S/S and trends of decreased cardiac output  - Administer and titrate ordered vasoactive medications to optimize hemodynamic stability  - Assess quality of pulses, skin color and temperature  - Assess for signs of decreased coronary artery perfusion  - Instruct patient to report change in severity of symptoms  7/26/2020 2030 by Vivienne Woo RN  Outcome: Progressing  7/26/2020 2023 by Vivienne Woo RN  Outcome: Progressing  Goal: Absence of cardiac dysrhythmias or at baseline rhythm  Description  INTERVENTIONS:  - Continuous cardiac monitoring, vital signs, obtain 12 lead EKG if ordered  - Administer antiarrhythmic and heart rate control medications as ordered  - Monitor electrolytes and administer replacement therapy as ordered  7/26/2020 2030 by Vivienne Woo RN  Outcome: Progressing  7/26/2020 2023 by Vivienne Woo RN  Outcome: Progressing     Problem: GENITOURINARY - ADULT  Goal: Maintains or returns to baseline urinary function  Description  INTERVENTIONS:  - Assess urinary function  - Encourage oral fluids to ensure adequate hydration if ordered  - Administer IV fluids as ordered to ensure adequate hydration  - Administer ordered medications as needed  - Offer frequent toileting  - Follow urinary retention protocol if ordered  7/26/2020 2030 by George Kyle RN  Outcome: Progressing  7/26/2020 2023 by George Kyle RN  Outcome: Progressing  Goal: Urinary catheter remains patent  Description  INTERVENTIONS:  - Assess patency of urinary catheter  - If patient has a chronic post, consider changing catheter if non-functioning  - Follow guidelines for intermittent irrigation of non-functioning urinary catheter  7/26/2020 2030 by George Kyle RN  Outcome: Progressing  7/26/2020 2023 by George Kyle RN  Outcome: Progressing     Problem: METABOLIC, FLUID AND ELECTROLYTES - ADULT  Goal: Electrolytes maintained within normal limits  Description  INTERVENTIONS:  - Monitor labs and assess patient for signs and symptoms of electrolyte imbalances  - Administer electrolyte replacement as ordered  - Monitor response to electrolyte replacements, including repeat lab results as appropriate  - Instruct patient on fluid and nutrition as appropriate  7/26/2020 2030 by George Kyle RN  Outcome: Progressing  7/26/2020 2023 by George Kyle RN  Outcome: Progressing  Goal: Fluid balance maintained  Description  INTERVENTIONS:  - Monitor labs   - Monitor I/O and WT  - Instruct patient on fluid and nutrition as appropriate  - Assess for signs & symptoms of volume excess or deficit  7/26/2020 2030 by George Kyle RN  Outcome: Progressing  7/26/2020 2023 by George Kyle RN  Outcome: Progressing  Goal: Glucose maintained within target range  Description  INTERVENTIONS:  - Monitor Blood Glucose as ordered  - Assess for signs and symptoms of hyperglycemia and hypoglycemia  - Administer ordered medications to maintain glucose within target range  - Assess nutritional intake and initiate nutrition service referral as needed  7/26/2020 2030 by George Kyle RN  Outcome: Progressing  7/26/2020 2023 by George Kyle RN  Outcome: Progressing     Problem: SKIN/TISSUE INTEGRITY - ADULT  Goal: Skin integrity remains intact  Description  INTERVENTIONS  - Identify patients at risk for skin breakdown  - Assess and monitor skin integrity  - Assess and monitor nutrition and hydration status  - Monitor labs (i e  albumin)  - Assess for incontinence   - Turn and reposition patient  - Assist with mobility/ambulation  - Relieve pressure over bony prominences  - Avoid friction and shearing  - Provide appropriate hygiene as needed including keeping skin clean and dry  - Evaluate need for skin moisturizer/barrier cream  - Collaborate with interdisciplinary team (i e  Nutrition, Rehabilitation, etc )   - Patient/family teaching  7/26/2020 2030 by Anatoly Lindsay RN  Outcome: Progressing  7/26/2020 2023 by Anatoly Lindsay RN  Outcome: Progressing     Problem: MUSCULOSKELETAL - ADULT  Goal: Maintain or return mobility to safest level of function  Description  INTERVENTIONS:  - Assess patient's ability to carry out ADLs; assess patient's baseline for ADL function and identify physical deficits which impact ability to perform ADLs (bathing, care of mouth/teeth, toileting, grooming, dressing, etc )  - Assess/evaluate cause of self-care deficits   - Assess range of motion  - Assess patient's mobility  - Assess patient's need for assistive devices and provide as appropriate  - Encourage maximum independence but intervene and supervise when necessary  - Involve family in performance of ADLs  - Assess for home care needs following discharge   - Consider OT consult to assist with ADL evaluation and planning for discharge  - Provide patient education as appropriate  7/26/2020 2030 by Anatoly Lindsay RN  Outcome: Progressing  7/26/2020 2023 by Anatoly Lindsay RN  Outcome: Progressing  Goal: Maintain proper alignment of affected body part  Description  INTERVENTIONS:  - Support, maintain and protect limb and body alignment  - Provide patient/ family with appropriate education  7/26/2020 2030 by Anatoly Lindsay RN  Outcome: Progressing  7/26/2020 2023 by Sydnee Abdullahi RN  Outcome: Progressing     Problem: SELF HARM  Goal: Effect of psychiatric condition will be minimized and patient will be protected from self harm  Description  INTERVENTIONS:  - Assess impact of patient's symptoms on level of functioning, self-care needs and offer support as indicated  - Assess patient/family knowledge of depression, impact on illness and need for teaching  - Provide emotional support, presence and reassurance  - Assess for possible suicidal thoughts, ideation or self-harm  If patient expresses suicidal thoughts or statements do not leave alone, notify physician/AP immediately, initiate suicide precautions, and determine need for continual observation    - initiate consults and referrals as appropriate (a mental health professional, [facility]  Outcome: Progressing

## 2020-07-27 NOTE — ASSESSMENT & PLAN NOTE
- Takes cymbalta at home, currently on hold  - Take Lamictal 150mg at home, continue
- Takes cymbalta at home, currently on hold  - Take Lamictal 150mg at home, continue
Alcohol level 218 on admission  Continue CIWA protocol  Continue folic acid/MVI/thiamine  Alcohol cessation
Attempted suicide after drinking a bottle of wine and ingesting at least 15 tabs of benadryl 25 mg     Brought in by private vehicle by    Agitated/altered on arrival   UDS negative   Tylenol level <2   Given 2 mg Ativan on arrival and intubated for airway protection shortly after   Continue propofol infusion for sedation    Continue 2mg Ativan prn for seizure activity   Given 2L Bolus in ED, continue Isolyte at 100ml/hr for light hydration, monitor strict I/O's
Attempted suicide after drinking a bottle of wine and ingesting at least 15 tabs of benadryl 25 mg     Brought in by private vehicle by    Agitated/altered on arrival   UDS negative   Tylenol level <2   Given 2 mg Ativan on arrival and intubated for airway protection shortly after   Extubated 7/23 and doing well on NC
Attempted suicide by drinking a bottle of wine and ingesting at least 15 tabs of Benadryl 25 mg   Brought in by   Agitated/confused on arrival  UDS negative  Tylenol level negative  Given 2 mg of Ativan on arrival and intubated for airway protection  Extubated on 7/23 and satting well air currently  Seen by psych, appreciate input  Recommend inpatient psych once medically cleared, continue one-to-one    Will consult crisis once patient is medically cleared
Attempted suicide by drinking a bottle of wine and ingesting at least 15 tabs of Benadryl 25 mg   Brought in by   Agitated/confused on arrival  UDS negative  Tylenol level negative  Given 2 mg of Ativan on arrival and intubated for airway protection  Extubated on 7/23 and satting well air currently  Seen by psych, appreciate input  Recommend inpatient psych once medically cleared, continue one-to-one    Will consult crisis once patient is medically cleared
Attempted suicide by drinking a bottle of wine and ingesting at least 15 tabs of Benadryl 25 mg   Brought in by   Agitated/confused on arrival  UDS negative  Tylenol level negative  Given 2 mg of Ativan on arrival and intubated for airway protection  Extubated on 7/23 and satting well air currently  Seen by psych, appreciate input  Recommend inpatient psych once medically cleared, continue one-to-one  Patient is medically cleared for inpatient psych  Consulted crisis 
Attempted suicide by drinking a bottle of wine and ingesting at least 15 tabs of Benadryl 25 mg   Brought in by   Agitated/confused on arrival  UDS negative  Tylenol level negative  Given 2 mg of Ativan on arrival and intubated for airway protection  Extubated on 7/23 and satting well air currently  Seen by psych, appreciate input  Recommend inpatient psych once medically cleared, continue one-to-one  Patient is medically cleared for inpatient psych  Discharge patient to inpatient psych today 
Body mass index is 33 55 kg/m²     Diet and lifestyle modification
Body mass index is 34 09 kg/m²     Diet and lifestyle modification
Continue Lamictal   Hold Cymbalta  Psych following appreciate input 
Extubated without incident  Satting well on room air  Encourage IS
H/o suicide attempt approximately 10 years ago with etoh as well as tylenol   Required psych admission after being medically treated  Per  patients father had been putting pressure on family by threatening to withhold financial support unless they live up to expectations  Son at [place] [facility] had a mental break down over this last week causing him to remove himself from the program  This is what sparked the patients recent etoh binge ending with a call to the patients father stating "im going to kill myself", he then sent a text to the  who found her in her room with an empty benadryl container and a bottle of wine     Pill count ingested approximately 15-20 tabs of 25mg benadryl   Etoh 218 on arrival
H/o suicide attempt approximately 10 years ago with etoh as well as tylenol   Required psych admission after being medically treated  Per  patients father had been putting pressure on family by threatening to withhold financial support unless they live up to expectations  Son at [place] [facility] had a mental break down over this last week causing him to remove himself from the program  This is what sparked the patients recent etoh binge ending with a call to the patients father stating "im going to kill myself", he then sent a text to the  who found her in her room with an empty benadryl container and a bottle of wine     Pill count ingested approximately 15-20 tabs of 25mg benadryl   Etoh 218 on arrival  pysch consult
Patient had Huffman in ICU  Failed voiding trial  Required straight cath x2 yielded 1200ml,1000ml each time  Likely secondary to anticholinergic effect  Creatinine normal   Huffman inserted, removed yesterday  Voiding well overnight  PVR was 0 last night    Continue Flomax  Consulted urology, appreciate input
Patient had Huffman in ICU  Failed voiding trial  Required straight cath x2 yielded 1200ml,1000ml each time  Likely secondary to anticholinergic effect  Creatinine normal   Huffman inserted, removed yesterday  Voiding well overnight  PVR was 0 last night  Continue Flomax  Consulted urology, appreciate input  Follow-up urology post discharge from inpatient psych 
Patient had Huffman in ICU  Failed voiding trial  Required straight cath x2 yielded 1200ml,1000ml each time  Patient reports unable to void  Likely secondary to anticholinergic effect    Creatinine normal   Huffman inserted yesterday  Consulted urology, appreciate input  Start Flomax  Keep Huffman for 3 days  DC Huffman on 7/28
Patient had Huffman in ICU  Removed yesterday  Straight cath this morning yielded 1200ml,last night 1000ml  Patient reports unable to void  Likely secondary to anticholinergic effect    Continue retention protocol  Creatinine normal 
Per  patient is intermittently binge drinking, mainly wine but sometimes drinks hard liquor    Recently has been drinking for the last several days   No history of etoh withdrawal   CIWA protocol
Per  patient is intermittently binge drinking, mainly wine but sometimes drinks hard liquor    Recently has been drinking for the last several days   No history of etoh withdrawal   CIWA protocol
Secondary to anticholinergic overdose  Received nicardipine drip in ICU  BP elevated overnight and this morning  Start Norvasc  Continue hydralazine p o  And Ativan p r n 
Secondary to anticholinergic overdose  Received nicardipine drip in ICU  BP elevated yesterday  Started Norvasc and labetalol  Hold for SBP < 130  Continue hydralazine p o  And Ativan p r n  Improving 
Secondary to anticholinergic overdose  Received nicardipine drip in ICU  BP elevated yesterday  Started Norvasc and labetalol  Hold for SBP < 130  Continue hydralazine p o  And Ativan p r n  Improving  Continue Norvasc, labral, hydralazine p r n  And Ativan p r n  On discharge  Continue to monitor BP in inpatient psych 
Secondary to anticholinergic overdose  Received nicardipine drip in ICU  BP improving  Monitor
Secondary to sedation medications required for anticholinergic drug overdose     Continue mechanical ventilation    Current settings AC/VC 22/450/80/8    Continue to wean support as tolerated    Keep spo2 >90%    Vent bundle ordered   Sedation:    Continue propofol    Ativan prn
· Secondary to anticholinergic overdose, resolved and off of Nicardipine
stretcher

## 2020-07-27 NOTE — PROGRESS NOTES
Progress Note - Urology      Patient: Melissa Bright   : 1965 Sex: female   MRN: [id number]     CSN: [id number]  Unit/Bed#: [address] [place] [address]     SUBJECTIVE:   Seen at the bedside  Doing well      Objective   Vitals: BP (!) 179/101   Pulse 74   Temp 97 6 °F (36 4 °C)   Resp 18   Ht 5' 6" (1 676 m)   Wt 94 3 kg (207 lb 14 3 oz)   LMP 2020   SpO2 94%   BMI 33 55 kg/m²     I/O last 24 hours: In: 8383 [P O :1880;  I V :960]  Out: 2250 [Urine:2250]      Physical Exam:   General Alert awake   Normocephalic atraumatic PERRLA  Lungs clear bilaterally  Cardiac normal S1 normal S2  Abdomen soft, flank pain  Extremities no edema      Lab Results: CBC:   Lab Results   Component Value Date    WBC 8 93 2020    HGB 14 2 2020    HCT 43 1 2020    MCV 95 2020     2020    MCH 31 1 2020    MCHC 32 9 2020    RDW 14 2 2020    MPV 10 0 2020    NRBC 0 2020     CMP:   Lab Results   Component Value Date     2020    CO2 27 2020    BUN 8 2020    CREATININE 0 82 2020    CALCIUM 8 8 2020    AST 25 2020    ALT 24 2020    ALKPHOS 116 2020    EGFR 82 2020     Urinalysis: No results found for: Irean Heck, SPECGRAV, PHUR, LEUKOCYTESUR, NITRITE, PROTEINUA, GLUCOSEU, KETONESU, BILIRUBINUR, BLOODU  Urine Culture: No results found for: URINECX  PSA: No results found for: PSA      Assessment/ Plan:  Acute urinary tension  On tamsulosin 0 4 mg  Voiding well with Huffman out  Discussed with patient at the bedside to follow up in the office check PVR in a few weeks          Tamela Yañez MD

## 2020-07-28 PROBLEM — Z00.8 MEDICAL CLEARANCE FOR PSYCHIATRIC ADMISSION: Status: ACTIVE | Noted: 2020-07-28

## 2020-07-28 PROBLEM — I10 ESSENTIAL HYPERTENSION: Status: ACTIVE | Noted: 2020-07-28

## 2020-07-28 PROBLEM — R30.0 DYSURIA: Status: ACTIVE | Noted: 2020-07-28

## 2020-07-28 PROBLEM — F43.10 PTSD (POST-TRAUMATIC STRESS DISORDER): Status: ACTIVE | Noted: 2020-07-28

## 2020-07-28 PROBLEM — Z72.89 ALCOHOL USE: Status: ACTIVE | Noted: 2020-07-28

## 2020-07-28 PROBLEM — Z78.9 ALCOHOL USE: Status: ACTIVE | Noted: 2020-07-28

## 2020-07-28 LAB
BACTERIA UR QL AUTO: ABNORMAL /HPF
BILIRUB UR QL STRIP: NEGATIVE
CHOLEST SERPL-MCNC: 263 MG/DL (ref 50–200)
CLARITY UR: ABNORMAL
COLOR UR: ABNORMAL
EST. AVERAGE GLUCOSE BLD GHB EST-MCNC: 97 MG/DL
GLUCOSE UR STRIP-MCNC: NEGATIVE MG/DL
HBA1C MFR BLD: 5 %
HDLC SERPL-MCNC: 49 MG/DL
HGB UR QL STRIP.AUTO: ABNORMAL
KETONES UR STRIP-MCNC: NEGATIVE MG/DL
LDLC SERPL CALC-MCNC: 174 MG/DL (ref 0–100)
LEUKOCYTE ESTERASE UR QL STRIP: ABNORMAL
NITRITE UR QL STRIP: NEGATIVE
NON-SQ EPI CELLS URNS QL MICRO: ABNORMAL /HPF
NONHDLC SERPL-MCNC: 214 MG/DL
PH UR STRIP.AUTO: 8 [PH]
PROT UR STRIP-MCNC: ABNORMAL MG/DL
RBC #/AREA URNS AUTO: ABNORMAL /HPF
RPR SER QL: NORMAL
SP GR UR STRIP.AUTO: 1.01 (ref 1–1.03)
TRIGL SERPL-MCNC: 200 MG/DL
TSH SERPL DL<=0.05 MIU/L-ACNC: 5.25 UIU/ML (ref 0.36–3.74)
UROBILINOGEN UR QL STRIP.AUTO: 0.2 E.U./DL
WBC #/AREA URNS AUTO: ABNORMAL /HPF

## 2020-07-28 PROCEDURE — 99254 IP/OBS CNSLTJ NEW/EST MOD 60: CPT | Performed by: PHYSICIAN ASSISTANT

## 2020-07-28 PROCEDURE — 86592 SYPHILIS TEST NON-TREP QUAL: CPT | Performed by: NURSE PRACTITIONER

## 2020-07-28 PROCEDURE — 99221 1ST HOSP IP/OBS SF/LOW 40: CPT | Performed by: PSYCHIATRY & NEUROLOGY

## 2020-07-28 PROCEDURE — 81001 URINALYSIS AUTO W/SCOPE: CPT | Performed by: PHYSICIAN ASSISTANT

## 2020-07-28 PROCEDURE — 80061 LIPID PANEL: CPT | Performed by: NURSE PRACTITIONER

## 2020-07-28 PROCEDURE — 84443 ASSAY THYROID STIM HORMONE: CPT | Performed by: NURSE PRACTITIONER

## 2020-07-28 PROCEDURE — 83036 HEMOGLOBIN GLYCOSYLATED A1C: CPT | Performed by: NURSE PRACTITIONER

## 2020-07-28 RX ORDER — LORAZEPAM 1 MG/1
1 TABLET ORAL EVERY 6 HOURS PRN
Status: DISCONTINUED | OUTPATIENT
Start: 2020-07-28 | End: 2020-07-31 | Stop reason: HOSPADM

## 2020-07-28 RX ORDER — PHENAZOPYRIDINE HYDROCHLORIDE 100 MG/1
100 TABLET, FILM COATED ORAL EVERY 8 HOURS PRN
Status: DISCONTINUED | OUTPATIENT
Start: 2020-07-28 | End: 2020-07-31 | Stop reason: HOSPADM

## 2020-07-28 RX ORDER — LAMOTRIGINE 100 MG/1
200 TABLET ORAL
Status: DISCONTINUED | OUTPATIENT
Start: 2020-07-29 | End: 2020-07-31 | Stop reason: HOSPADM

## 2020-07-28 RX ORDER — DULOXETIN HYDROCHLORIDE 30 MG/1
120 CAPSULE, DELAYED RELEASE ORAL DAILY
Status: DISCONTINUED | OUTPATIENT
Start: 2020-07-29 | End: 2020-07-31 | Stop reason: HOSPADM

## 2020-07-28 RX ORDER — LORAZEPAM 1 MG/1
1 TABLET ORAL ONCE
Status: COMPLETED | OUTPATIENT
Start: 2020-07-28 | End: 2020-07-28

## 2020-07-28 RX ADMIN — AMLODIPINE BESYLATE 5 MG: 5 TABLET ORAL at 17:14

## 2020-07-28 RX ADMIN — Medication 1 TABLET: at 08:30

## 2020-07-28 RX ADMIN — LAMOTRIGINE 150 MG: 100 TABLET ORAL at 08:30

## 2020-07-28 RX ADMIN — LABETALOL HYDROCHLORIDE 300 MG: 200 TABLET, FILM COATED ORAL at 14:46

## 2020-07-28 RX ADMIN — LORAZEPAM 1 MG: 1 TABLET ORAL at 22:06

## 2020-07-28 RX ADMIN — TAMSULOSIN HYDROCHLORIDE 0.4 MG: 0.4 CAPSULE ORAL at 17:14

## 2020-07-28 RX ADMIN — AMLODIPINE BESYLATE 5 MG: 5 TABLET ORAL at 08:30

## 2020-07-28 RX ADMIN — TRAZODONE HYDROCHLORIDE 50 MG: 50 TABLET ORAL at 21:42

## 2020-07-28 RX ADMIN — DULOXETINE 60 MG: 30 CAPSULE, DELAYED RELEASE ORAL at 08:30

## 2020-07-28 RX ADMIN — THIAMINE HCL TAB 100 MG 100 MG: 100 TAB at 08:30

## 2020-07-28 RX ADMIN — FOLIC ACID 1 MG: 1 TABLET ORAL at 08:30

## 2020-07-28 NOTE — CONSULTS
Consult- Yvetta Schaumann 1965, 49 y o  female MRN: [id number]    Unit/Bed#: Aris Hunt 254-01 Encounter: [id number]    Primary Care Provider: No primary care provider on file     Date and time admitted to hospital: 7/27/2020  8:33 PM      Inpatient consult for Medical Clearance for [facility] patient  Consult performed by: Christopher Powell PA-C  Consult ordered by: KYLE Helms        Medical clearance for psychiatric admission  Assessment & Plan  · Vital signs stable at time of assessment  · CBC, BMP WNL  · TSH pending  · EKG from 7/23/20:  NSR, borderline QTc 472  · Patient appears medically stable at this time for inpatient psychiatric treatment    Dysuria  Assessment & Plan  · Reporting mild dysuria, did have post catheter removed recently  · No suprapubic tenderness/hematuria  · Obtain UA  · May be irritation from catheter, can trial pyridium PRN but discussed with patient to provide UA before taking the pyridium    Essential hypertension  Assessment & Plan  · Blood pressure stable  · Continue amlodipine, labetolol    Acute urinary retention  Assessment & Plan  · Issues with urinary retention recent medical admission requiring post catheter, which had since been removed  · Continue to monitor for urinary retention  · Continue flomax  · Outpatient follow up with urology    Suicide attempt [facility])  Assessment & Plan  · Admitted to [place] [address] due to intentional benadryl overdose, alcohol intoxication requiring intubation and ICU admission  · Required nicardipine gtt due to autonomic dysfunction - now resolved  · Patient was medically cleared for discharge to [facility]    * Bipolar depression [facility])  Assessment & Plan  · Transferred to [facility] after medical admission for intentional overdose  · Further plan per psychiatry    VTE Prophylaxis: Reason for no pharmacologic prophylaxis low risk, ambulate  / reason for no mechanical VTE prophylaxis psychiatric unit, ambulate     Recommendations for Discharge:  · Follow up with PCP after discharge    Counseling / Coordination of Care Time: 30 minutes  Greater than 50% of total time spent on patient counseling and coordination of care  Collaboration of Care: Were Recommendations Directly Discussed with Primary Treatment Team? - No     History of Present Illness:    Lucio Hunt is a 49 y o  female who is originally admitted to the psychiatry service due to intentional OD  We are consulted for medical clearance  Past medical history significant for depression, essential HTN  Patient presented as a transfer from [place] [address] after being admitted for intentional OD with benadryl requiring intubation and ICU admission with autonomic dysfunction  Patient was successfully extubated and was medically cleared for transfer to [facility]  Currently reports mild dysuria, denies suprapubic tenderness, fever/chills, nausea/vomiting, hematuria, flank pain  Did have post catheter removed recently  Otherwise no chest pain/palpitations, shortness of breath  Review of Systems:    Review of Systems   Constitutional: Negative for chills, fatigue, fever and unexpected weight change  HENT: Negative for congestion, sore throat and trouble swallowing  Eyes: Negative for photophobia, pain and visual disturbance  Respiratory: Negative for cough, shortness of breath and wheezing  Cardiovascular: Negative for chest pain, palpitations and leg swelling  Gastrointestinal: Negative for abdominal pain, constipation, diarrhea, nausea and vomiting  Endocrine: Negative for polyuria  Genitourinary: Positive for dysuria  Negative for difficulty urinating, flank pain, hematuria and urgency  Musculoskeletal: Negative for back pain, myalgias, neck pain and neck stiffness  Skin: Negative for pallor and rash  Neurological: Negative for dizziness, tremors, syncope, weakness, light-headedness, numbness and headaches  Hematological: Does not bruise/bleed easily  Psychiatric/Behavioral: Positive for dysphoric mood, self-injury and suicidal ideas  Negative for agitation and confusion  Past Medical and Surgical History:     Past Medical History:   Diagnosis Date    Depression     Psychiatric disorder        No past surgical history on file  Meds/Allergies:    all medications and allergies reviewed    Allergies: Allergies   Allergen Reactions    Penicillins        Social History:     Marital Status: /Civil Union    Substance Use History:   Social History     Substance and Sexual Activity   Alcohol Use Yes    Frequency: 2-3 times a week    Drinks per session: 3 or 4     Social History     Tobacco Use   Smoking Status Current Every Day Smoker    Packs/day: 0 50   Smokeless Tobacco Never Used     Social History     Substance and Sexual Activity   Drug Use Not Currently       Family History:    History reviewed  No pertinent family history  Physical Exam:     Vitals:   Blood Pressure: 130/77 (07/28/20 0722)  Pulse: 65 (07/28/20 0722)  Temperature: (!) 97 °F (36 1 °C) (07/28/20 0722)  Temp Source: Tympanic (07/28/20 0722)  Respirations: 17 (07/28/20 0722)  Height: 5' 7" (170 2 cm) (07/27/20 2037)  Weight - Scale: 94 kg (207 lb 3 2 oz)(Waist -40 5in) (07/27/20 2037)  SpO2: 95 % (07/28/20 8867)    Physical Exam   Constitutional: She is oriented to person, place, and time  Vital signs are normal  She appears well-developed  Appears comfortable, no acute distress   HENT:   Head: Normocephalic  Eyes: Pupils are equal, round, and reactive to light  Conjunctivae and EOM are normal  No scleral icterus  Neck: Normal range of motion  Cardiovascular: Normal rate, regular rhythm and normal heart sounds  No murmur heard  Pulmonary/Chest: Effort normal and breath sounds normal  No respiratory distress  She has no wheezes  She has no rhonchi  She has no rales  Abdominal: Soft  Bowel sounds are normal  There is no tenderness   There is no rigidity, no rebound and no guarding  Musculoskeletal: She exhibits no edema, tenderness or deformity  Able to ambulate without difficulty, no edema   Neurological: She is alert and oriented to person, place, and time  Skin: Skin is warm and dry  Psychiatric: She has a normal mood and affect  Her speech is normal and behavior is normal    Nursing note and vitals reviewed  Additional Data:     Lab Results: I have personally reviewed pertinent reports  Results from last 7 days   Lab Units 07/27/20  0546 07/25/20  0513   WBC Thousand/uL 8 93 9 18   HEMOGLOBIN g/dL 14 2 14 4   HEMATOCRIT % 43 1 43 4   PLATELETS Thousands/uL 232 213   NEUTROS PCT %  --  61   LYMPHS PCT %  --  25   MONOS PCT %  --  7   EOS PCT %  --  7*     Results from last 7 days   Lab Units 07/27/20  0546  07/25/20  0513   SODIUM mmol/L 136   < > 136   POTASSIUM mmol/L 3 8   < > 3 9   CHLORIDE mmol/L 101   < > 103   CO2 mmol/L 27   < > 24   BUN mg/dL 8   < > 11   CREATININE mg/dL 0 82   < > 0 73   ANION GAP mmol/L 8   < > 9   CALCIUM mg/dL 8 8   < > 8 3   ALBUMIN g/dL  --   --  3 2*   TOTAL BILIRUBIN mg/dL  --   --  0 50   ALK PHOS U/L  --   --  116   ALT U/L  --   --  24   AST U/L  --   --  25   GLUCOSE RANDOM mg/dL 91   < > 79    < > = values in this interval not displayed  Results from last 7 days   Lab Units 07/23/20  1409 07/22/20  2211   TROPONIN I ng/mL <0 02 <0 02     No results found for: HGBA1C            Imaging: I have personally reviewed pertinent reports  No orders to display       EKG, Pathology, and Other Studies Reviewed on Admission:   · Reviewed as above    ** Please Note: This note has been constructed using a voice recognition system   **

## 2020-07-28 NOTE — ASSESSMENT & PLAN NOTE
· Admitted to [place] [address] due to intentional benadryl overdose, alcohol intoxication requiring intubation and ICU admission  · Required nicardipine gtt due to autonomic dysfunction - now resolved  · Patient was medically cleared for discharge to [facility]

## 2020-07-28 NOTE — TREATMENT PLAN
TREATMENT PLAN REVIEW - [address] Manuel 49 y o  1965 female MRN: [id number]    [address] [place] [address] Room / Bed: [place] [address]/[facility] 195-62 Encounter: [id number]          Admit Date/Time:  7/27/2020  8:33 PM    Treatment Team: Attending Provider: Leopold Carmel, DO; Consulting Physician: Carrillo Trammell MD; Registered Nurse: Ulices Wright, RN; Patient Care Technician: Magalys Matias; Patient Care Assistant: Salima Arita; Registered Nurse: Gurpreet Brandon RN; Registered Nurse: Monet Pacheco, CED; Care Manager: Lyn Saleh RN; : Jane Goff; Occupational Therapy Assistant: Josiane Sexton, [facility] [address] ;  Therapy Student: Monse Kathleen; Security: Marisol Naik    Diagnosis: Principal Problem:    Bipolar depression ([facility] )  Active Problems:    PTSD (post-traumatic stress disorder)    Alcohol use    Suicide attempt ([place] [address] )    Acute urinary retention    Medical clearance for psychiatric admission    Essential hypertension    Dysuria      Patient Strengths/Assets: cooperative, patient is on a voluntary commitment    Patient Barriers/Limitations: limited support system, substance abuse, Chronic mental illness    Short Term Goals: decrease in depressive symptoms, decrease in anxiety symptoms, decrease in suicidal thoughts    Long Term Goals: improvement in anxiety, resolution of depressive symptoms, stabilization of mood, free of suicidal thoughts    Progress Towards Goals: starting psychiatric medications as prescribed    Recommended Treatment: medication management, patient medication education, group therapy, milieu therapy, continued Behavioral Health psychiatric evaluation/assessment process    Treatment Frequency: daily medication monitoring, group and milieu therapy daily, monitoring through interdisciplinary rounds, monitoring through weekly patient care conferences    Expected Discharge Date:  5-7 midnights    Discharge Plan: referral for outpatient drug and alcohol counseling, referral for outpatient medication management with a psychiatrist, referral for outpatient psychotherapy    Treatment Plan Created/Updated By: Verna David DO

## 2020-07-28 NOTE — ASSESSMENT & PLAN NOTE
· Vital signs stable at time of assessment  · CBC, BMP WNL  · TSH pending  · EKG from 7/23/20:  NSR, borderline QTc 472  · Patient appears medically stable at this time for inpatient psychiatric treatment

## 2020-07-28 NOTE — ASSESSMENT & PLAN NOTE
· Transferred to [facility] after medical admission for intentional overdose  · Further plan per psychiatry

## 2020-07-28 NOTE — PROGRESS NOTES
07/28/20 1047   Team Meeting   Meeting Type Daily Rounds   Team Members Present   Team Members Present Physician;Nurse;;Occupational Therapist   Physician Team Member [place]   Nursing Team Member DEANDRE [facility] Management Team Member Zhou ZUNIGA Team Member [facility]   Patient/Family Present   Patient Present No   Patient's Family Present No     201, admission from [place] [facility] for suicide attempt by ovedrosing on benadryl and ETOH  History of ETOH misuse

## 2020-07-28 NOTE — PROGRESS NOTES
Patient belongings upon arrival;  -(2) Pants  -(2) Skorts  -(5) Wing Sprang  -(8) Shirts  -(6) Underwear  -Slippers  -Socks  -Ipad w/Orange case  -  -Shaving razor  -Shaving blades  -(2) Sodas  -Black pocketbook  -(2) Toothbrushes  -Lotion  -Toothpaste  -(3) Books  -(3) Pair glasses  -Sanitary napkins    Pt belongings at bedside;  -[address] [facility]  -(3) Shorts  -(3) Underwear  -(3) Bra  -(3) Books  -Slippers  -Socks  -Glasses  -Sanitary napkins
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
## 2020-07-28 NOTE — ED NOTES
Insurance Authorization for admission:   Phone call placed to [facility]  Phone number: [phone]  Spoke to [employer]      5 days approved  Level of care: Inpatient  Review on 7/31/20  Authorization # [id number]  Concurrent review on 7/31/20 with Janet Urbina @ [phone]         EVS (Eligibility Verification System) called - [phone]    Automated system indicates: not on file

## 2020-07-28 NOTE — TREATMENT TEAM
AM rounds per report from previous shift: Sonnerobertlucila 24 s/p OD 19 benadryl and bottle of wine, hx: alcohol abuse rehab in the past, overwhelmed at home with three kids and stress of father, ERICK 2, CIWA protocol, requires admit order unable to release signed and held, labs need to be completed

## 2020-07-28 NOTE — PLAN OF CARE
Problem: DISCHARGE PLANNING  Goal: Discharge to home or other facility with appropriate resources  Description  INTERVENTIONS:  - Identify barriers to discharge w/patient and caregiver  - Arrange for needed discharge resources and transportation as appropriate  - Identify discharge learning needs (meds, wound care, etc )  - Refer to Case Management Department for coordinating discharge planning if the patient needs post-hospital services based on physician/advanced practitioner order or complex needs related to functional status, cognitive ability, or social support system   Outcome: Progressing     Problem: SELF HARM/SUICIDALITY  Goal: Will have no self-injury during hospital stay  Description  INTERVENTIONS:  - Q 15 MINUTES: Routine safety checks  - Q WAKING SHIFT & PRN: Assess risk to determine if routine checks are adequate to maintain patient safety  - Encourage patient to participate actively in care by formulating a plan to combat response to suicidal ideation, identify supports and resources  Outcome: Progressing     Problem: DEPRESSION  Goal: Will be euthymic at discharge  Description  INTERVENTIONS:  - Administer medication as ordered  - Provide emotional support via 1:1 interaction with staff  - Encourage involvement in milieu/groups/activities  - Monitor for social isolation  Outcome: Progressing

## 2020-07-28 NOTE — QUICK NOTE
Atarax given at 2202 for moderate anxiety appears to have been effective as patient is resting comfortably in bed

## 2020-07-28 NOTE — ASSESSMENT & PLAN NOTE
· Issues with urinary retention recent medical admission requiring post catheter, which had since been removed  · Continue to monitor for urinary retention  · Continue flomax  · Outpatient follow up with urology
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
## 2020-07-28 NOTE — ASSESSMENT & PLAN NOTE
· Reporting mild dysuria, did have post catheter removed recently  · No suprapubic tenderness/hematuria  · Obtain UA  · May be irritation from catheter, can trial pyridium PRN but discussed with patient to provide UA before taking the pyridium

## 2020-07-28 NOTE — PROGRESS NOTES
Pt is calm and cooperative, pleasant during interaction  Attending groups  Reading a book in room when not group time  Reports feeling safe on the unit  Denies thoughts of self harm  Reports sleeping well overnight

## 2020-07-28 NOTE — TREATMENT PLAN
RN will meet with the patient at least twice daily to assess for any concerns and educate regarding prescribed medications, diagnosis and the proper utilization of coping skills

## 2020-07-28 NOTE — PROGRESS NOTES
07/28/20 0915 07/28/20 1000 07/28/20 1100   Activity/Group Checklist   Group Admission/Discharge  (Admission self assessment) [employer]  (gifts of recovery/goal setting) Life Skills  (emotional resilieny)   Attendance Attended Attended Did not attend   Attendance Duration (min) 16-30 16-30  --    Interactions Interacted appropriately Interacted appropriately  --    Affect/Mood Appropriate;Calm Appropriate;Calm  --    Goals Achieved Identified feelings; Identified triggers; Able to listen to others; Able to engage in interactions; Able to reflect/comment on own behavior;Able to self-disclose; Other (Comment)  (demonstrates healthy insight & motivation for recovery ) Able to listen to others; Able to engage in interactions; Able to self-disclose; Identified feelings; Discussed coping strategies; Able to reflect/comment on own behavior  --       07/28/20 1415   Activity/Group Checklist   Group Other (Comment)  (creative expression group)   Attendance Attended   Attendance Duration (min) 31-45   Interactions Interacted appropriately   Affect/Mood Appropriate;Calm   Goals Achieved Discussed coping strategies; Able to listen to others; Able to engage in interactions; Able to reflect/comment on own behavior;Able to self-disclose   Pt attended the majority of therapeutic groups today  She presented as pleasant and cooperative  During self assessment, pt identified several family related stressors that led to admission  She engaged appropriately in group activities and was able to set a recovery focused goal for today

## 2020-07-29 PROCEDURE — 99232 SBSQ HOSP IP/OBS MODERATE 35: CPT | Performed by: PSYCHIATRY & NEUROLOGY

## 2020-07-29 RX ORDER — PANTOPRAZOLE SODIUM 40 MG/1
40 TABLET, DELAYED RELEASE ORAL
Status: DISCONTINUED | OUTPATIENT
Start: 2020-07-29 | End: 2020-07-31 | Stop reason: HOSPADM

## 2020-07-29 RX ORDER — CLONAZEPAM 1 MG/1
1 TABLET ORAL
Status: DISCONTINUED | OUTPATIENT
Start: 2020-07-29 | End: 2020-07-31 | Stop reason: HOSPADM

## 2020-07-29 RX ADMIN — FOLIC ACID 1 MG: 1 TABLET ORAL at 08:52

## 2020-07-29 RX ADMIN — Medication 1 TABLET: at 08:54

## 2020-07-29 RX ADMIN — CLONAZEPAM 1 MG: 1 TABLET ORAL at 21:30

## 2020-07-29 RX ADMIN — TRAZODONE HYDROCHLORIDE 50 MG: 50 TABLET ORAL at 21:53

## 2020-07-29 RX ADMIN — TAMSULOSIN HYDROCHLORIDE 0.4 MG: 0.4 CAPSULE ORAL at 15:46

## 2020-07-29 RX ADMIN — AMLODIPINE BESYLATE 5 MG: 5 TABLET ORAL at 08:52

## 2020-07-29 RX ADMIN — AMLODIPINE BESYLATE 5 MG: 5 TABLET ORAL at 17:06

## 2020-07-29 RX ADMIN — THIAMINE HCL TAB 100 MG 100 MG: 100 TAB at 08:52

## 2020-07-29 RX ADMIN — DULOXETINE 120 MG: 30 CAPSULE, DELAYED RELEASE ORAL at 08:51

## 2020-07-29 RX ADMIN — PANTOPRAZOLE SODIUM 40 MG: 40 TABLET, DELAYED RELEASE ORAL at 12:31

## 2020-07-29 RX ADMIN — LAMOTRIGINE 200 MG: 100 TABLET ORAL at 21:30

## 2020-07-29 NOTE — PROGRESS NOTES
Progress Note - [address]y [age] y o  female MRN: [id number]  Unit/Bed#: [facility] 254-01 Encounter: [id number]    Assessment/Plan   Principal Problem:    Bipolar depression ([facility] 75 )  Active Problems:    PTSD (post-traumatic stress disorder)    Alcohol use    Suicide attempt ([facility] 75 )    Acute urinary retention    Medical clearance for psychiatric admission    Essential hypertension    Dysuria      Subjective:  Pt is pleasant, calm and cooperative  Pt feels she has been doing well  Pt was upset because her Klonopin at bedtime she has been on for 15 years was not on her profile  She is worried about withdrawal from the klonopin  Unfortunately, there was not an oral ativan on pt's profile and insight needed to be called  Pt has been reading as well as going to most groups  Pt is eating meals  She slept last night but feels the trazodone makes her groggy  Pt is tolerating the increase of lamictal without serious side effects  Pt to possibly go to a PHP program after hospital discharge most likely early next week   Pt denies SI/HI/AH/VH            Current Medications:  Current Facility-Administered Medications   Medication Dose Route Frequency    acetaminophen (TYLENOL) tablet 650 mg  650 mg Oral Q6H PRN    acetaminophen (TYLENOL) tablet 650 mg  650 mg Oral Q4H PRN    acetaminophen (TYLENOL) tablet 975 mg  975 mg Oral Q6H PRN    aluminum-magnesium hydroxide-simethicone (MYLANTA) 200-200-20 mg/5 mL oral suspension 30 mL  30 mL Oral Q4H PRN    amLODIPine (NORVASC) tablet 5 mg  5 mg Oral BID    benztropine (COGENTIN) injection 1 mg  1 mg Intramuscular Q6H PRN    benztropine (COGENTIN) tablet 1 mg  1 mg Oral Q6H PRN    clonazePAM (KlonoPIN) tablet 1 mg  1 mg Oral HS    DULoxetine (CYMBALTA) delayed release capsule 120 mg  120 mg Oral Daily    folic acid (FOLVITE) tablet 1 mg  1 mg Oral Daily    haloperidol (HALDOL) tablet 5 mg  5 mg Oral Q8H PRN    haloperidol lactate (HALDOL) injection 5 mg  5 mg Intramuscular Q8H PRN    hydrOXYzine HCL (ATARAX) tablet 25 mg  25 mg Oral Q6H PRN    hydrOXYzine HCL (ATARAX) tablet 50 mg  50 mg Oral Q4H PRN    lamoTRIgine (LaMICtal) tablet 200 mg  200 mg Oral HS    LORazepam (ATIVAN) tablet 1 mg  1 mg Oral Q6H PRN    magnesium hydroxide (MILK OF MAGNESIA) 400 mg/5 mL oral suspension 30 mL  30 mL Oral Daily PRN    multivitamin-minerals (CENTRUM ADULTS) tablet 1 tablet  1 tablet Oral Daily    OLANZapine (ZyPREXA) IM injection 2 5 mg  2 5 mg Intramuscular Q3H PRN    OLANZapine (ZyPREXA) tablet 2 5 mg  2 5 mg Oral Q3H PRN    pantoprazole (PROTONIX) EC tablet 40 mg  40 mg Oral Early Morning    phenazopyridine (PYRIDIUM) tablet 100 mg  100 mg Oral Q8H PRN    risperiDONE (RisperDAL M-TABS) dispersible tablet 1 mg  1 mg Oral Q6H PRN    tamsulosin (FLOMAX) capsule 0 4 mg  0 4 mg Oral Daily With Dinner    thiamine (VITAMIN B1) tablet 100 mg  100 mg Oral Daily    traZODone (DESYREL) tablet 50 mg  50 mg Oral HS PRN       Behavioral Health Medications: all current active meds have been reviewed  Vitals:  Vitals:    07/29/20 0818   BP: 122/67   Pulse: 67   Resp: 20   Temp: 97 5 °F (36 4 °C)   SpO2:        Laboratory results:    I have personally reviewed all pertinent laboratory/tests results    Most Recent Labs:   Lab Results   Component Value Date    WBC 8 93 07/27/2020    RBC 4 56 07/27/2020    HGB 14 2 07/27/2020    HCT 43 1 07/27/2020     07/27/2020    RDW 14 2 07/27/2020    NEUTROABS 5 57 07/25/2020    SODIUM 136 07/27/2020    K 3 8 07/27/2020     07/27/2020    CO2 27 07/27/2020    BUN 8 07/27/2020    CREATININE 0 82 07/27/2020    GLUC 91 07/27/2020    CALCIUM 8 8 07/27/2020    AST 25 07/25/2020    ALT 24 07/25/2020    ALKPHOS 116 07/25/2020    TP 6 3 (L) 07/25/2020    ALB 3 2 (L) 07/25/2020    TBILI 0 50 07/25/2020    CHOLESTEROL 263 (H) 07/28/2020    HDL 49 07/28/2020    TRIG 200 (H) 07/28/2020    LDLCALC 174 (H) 07/28/2020    [place] 214 07/28/2020 [id number] 5 246 (H) 07/28/2020    RPR Non-Reactive 07/28/2020    HGBA1C 5 0 07/28/2020    EAG 97 07/28/2020       Psychiatric Review of Systems:  Behavior over the last 24 hours:  improving  Sleep: improving  Appetite: normal  Medication side effects: No  ROS: no complaints, all others negative    Mental Status Evaluation:  Appearance:  casually dressed   Behavior:  cooperative   Speech:  normal pitch and normal volume   Mood:  less depressed and anxious   Affect:  constricted   Language Appropriate   Thought Process:  goal directed   Thought Content:  no overt delusions   Perceptual Disturbances: None   Risk Potential: Suicidal Ideations none, Homicidal Ideations none and Potential for Aggression No   Sensorium:  person, place and time/date   Cognition:  recent and remote memory grossly intact   Consciousness:  alert and awake    Attention: attention span and concentration were age appropriate   Insight:  improving   Judgment: improving   Gait/Station: normal gait/station and normal balance   Motor Activity: no abnormal movements     Progress Toward Goals: progressing    Recommended Treatment: Continue with group therapy, milieu therapy and occupational therapy  1 Klonopin 1 mg po QHS      Risks, benefits and possible side effects of Medications:   Risks, benefits, and possible side effects of medications explained to patient and patient verbalizes understanding  Risks of medications in pregnancy explained if female patient  Patient verbalizes understanding and agrees to notify her doctor if she becomes pregnant

## 2020-07-29 NOTE — PROGRESS NOTES
Pt stated she was on klonopin for 10 yrs and has not had any for three days requesting a medication to prevent withdrawal symptoms [facility] notified of issue   Pt medicated as directed

## 2020-07-29 NOTE — PROGRESS NOTES
Pt is calm and cooperative, pleasant during interaction  Reports sleeping ok but is concerned that her home dose of Klonopin 1mg at HS was not ordered  Pt states she has been taking med for sleep for 15 years  Pt also states that Trazodone is leaving her feeling a little groggy in the morning  New order has been placed for Klonopin at HS per pt request   Pt otherwise denies any needs or concerns at this time  Pt is visible on unit, social and attending groups    Denies SI

## 2020-07-29 NOTE — PROGRESS NOTES
Vitals taken for morning medication  /82   Patient refused scheduled labetalol after discussing parameters with this writer  She reported she took it yesterday and her BP got low and she felt dizzy  She would like to speak with the doctor about it further  Denies s/s at this time  Charge RN notified of same

## 2020-07-29 NOTE — PROGRESS NOTES
07/29/20 0800   Team Meeting   Meeting Type Daily Rounds   Team Members Present   Team Members Present Physician;Nurse;;Occupational Therapist   Physician Team Member Dr Marva Arthur Team Member DE[facility]RE [facility] Management Team Member [id number]   OT Team Member [facility]    Patient/Family Present   Patient Present No   Patient's Family Present No     Patient will be referred to [facility] upon discharge   Treating psychiatrist will coordinate with patient's outpatient psychiatrist Steven Reyez

## 2020-07-29 NOTE — CASE MANAGEMENT
This writer met with patient, completed treatment plan  This writer discussed with patient her discharge plan  This writer discussed patient's Dr Zimmer recommendation to [facility] following treatment in the hospital  Patient is in agreement  Patient signed GERALDINE for [facility]  This writer contacted Juany6 Blayne Vazquez completed and faxed Intake for [facility]  Neva Lackey will call with decision

## 2020-07-30 PROCEDURE — 99232 SBSQ HOSP IP/OBS MODERATE 35: CPT | Performed by: PSYCHIATRY & NEUROLOGY

## 2020-07-30 RX ORDER — IBUPROFEN 400 MG/1
400 TABLET ORAL EVERY 6 HOURS PRN
Status: DISCONTINUED | OUTPATIENT
Start: 2020-07-30 | End: 2020-07-30

## 2020-07-30 RX ORDER — LIDOCAINE 50 MG/G
1 PATCH TOPICAL
Status: DISCONTINUED | OUTPATIENT
Start: 2020-07-30 | End: 2020-07-31 | Stop reason: HOSPADM

## 2020-07-30 RX ORDER — IBUPROFEN 800 MG/1
800 TABLET ORAL EVERY 6 HOURS PRN
Status: DISCONTINUED | OUTPATIENT
Start: 2020-07-30 | End: 2020-07-31 | Stop reason: HOSPADM

## 2020-07-30 RX ADMIN — HYDROXYZINE HYDROCHLORIDE 50 MG: 50 TABLET, FILM COATED ORAL at 22:04

## 2020-07-30 RX ADMIN — LAMOTRIGINE 200 MG: 100 TABLET ORAL at 22:00

## 2020-07-30 RX ADMIN — DULOXETINE 120 MG: 30 CAPSULE, DELAYED RELEASE ORAL at 09:21

## 2020-07-30 RX ADMIN — THIAMINE HCL TAB 100 MG 100 MG: 100 TAB at 09:21

## 2020-07-30 RX ADMIN — LIDOCAINE 1 PATCH: 50 PATCH TOPICAL at 22:50

## 2020-07-30 RX ADMIN — PANTOPRAZOLE SODIUM 40 MG: 40 TABLET, DELAYED RELEASE ORAL at 06:36

## 2020-07-30 RX ADMIN — Medication 1 TABLET: at 09:21

## 2020-07-30 RX ADMIN — CLONAZEPAM 1 MG: 1 TABLET ORAL at 22:01

## 2020-07-30 RX ADMIN — AMLODIPINE BESYLATE 5 MG: 5 TABLET ORAL at 09:20

## 2020-07-30 RX ADMIN — FOLIC ACID 1 MG: 1 TABLET ORAL at 09:20

## 2020-07-30 RX ADMIN — AMLODIPINE BESYLATE 5 MG: 5 TABLET ORAL at 17:34

## 2020-07-30 NOTE — PROGRESS NOTES
Progress Note - Viky [age] y o  female MRN: [id number]  Unit/Bed#: [facility] 254-01 Encounter: [id number]    Assessment/Plan   Principal Problem:    Bipolar depression ([facility] 75 )  Active Problems:    PTSD (post-traumatic stress disorder)    Alcohol use    Suicide attempt ([facility] 75 )    Acute urinary retention    Medical clearance for psychiatric admission    Essential hypertension    Dysuria      Subjective:  Pt is calm and cooperative  She is frustrated about her length of stay and wants to be home with family even if they are part of the frustration  Pt talks about her controlling father treating and talking at her and her older brother badly  Apparently, father had threatened to stop pt's portion of money from the business over sons stopping [address]  Pt says that's a large portion of there income  Pt says she still isn't sure where all that is right now but doesn't want to talk to him right now  Pt is compliant with most everything including groups, meals, medications and tolerates without serious side effects  Pt reports sleeping good  Pt still has issues with blood pressure  Medicine is to see her  Pt to attend a PHP on discharge tomorrow  Discussed with pt's psychiatrist about plan and she is agrees with plan  She says she will make effort to get pt in ASAP  Pt denies SI/HI/AH/VH  Pt is hopeful for discharge tomorrow           Current Medications:  Current Facility-Administered Medications   Medication Dose Route Frequency    acetaminophen (TYLENOL) tablet 650 mg  650 mg Oral Q6H PRN    acetaminophen (TYLENOL) tablet 975 mg  975 mg Oral Q6H PRN    aluminum-magnesium hydroxide-simethicone (MYLANTA) 200-200-20 mg/5 mL oral suspension 30 mL  30 mL Oral Q4H PRN    amLODIPine (NORVASC) tablet 5 mg  5 mg Oral BID    benztropine (COGENTIN) injection 1 mg  1 mg Intramuscular Q6H PRN    benztropine (COGENTIN) tablet 1 mg  1 mg Oral Q6H PRN    clonazePAM (KlonoPIN) tablet 1 mg  1 mg Oral HS    DULoxetine (CYMBALTA) delayed release capsule 120 mg  120 mg Oral Daily    folic acid (FOLVITE) tablet 1 mg  1 mg Oral Daily    haloperidol (HALDOL) tablet 5 mg  5 mg Oral Q8H PRN    haloperidol lactate (HALDOL) injection 5 mg  5 mg Intramuscular Q8H PRN    hydrOXYzine HCL (ATARAX) tablet 25 mg  25 mg Oral Q6H PRN    hydrOXYzine HCL (ATARAX) tablet 50 mg  50 mg Oral Q4H PRN    ibuprofen (MOTRIN) tablet 800 mg  800 mg Oral Q6H PRN    lamoTRIgine (LaMICtal) tablet 200 mg  200 mg Oral HS    lidocaine (LIDODERM) 5 % patch 1 patch  1 patch Topical HS    LORazepam (ATIVAN) tablet 1 mg  1 mg Oral Q6H PRN    magnesium hydroxide (MILK OF MAGNESIA) 400 mg/5 mL oral suspension 30 mL  30 mL Oral Daily PRN    multivitamin-minerals (CENTRUM ADULTS) tablet 1 tablet  1 tablet Oral Daily    OLANZapine (ZyPREXA) IM injection 2 5 mg  2 5 mg Intramuscular Q3H PRN    OLANZapine (ZyPREXA) tablet 2 5 mg  2 5 mg Oral Q3H PRN    pantoprazole (PROTONIX) EC tablet 40 mg  40 mg Oral Early Morning    phenazopyridine (PYRIDIUM) tablet 100 mg  100 mg Oral Q8H PRN    risperiDONE (RisperDAL M-TABS) dispersible tablet 1 mg  1 mg Oral Q6H PRN    thiamine (VITAMIN B1) tablet 100 mg  100 mg Oral Daily    traZODone (DESYREL) tablet 50 mg  50 mg Oral HS PRN       Behavioral Health Medications: all current active meds have been reviewed and continue current psychiatric medications  Vitals:  Vitals:    07/30/20 0756   BP: 145/88   Pulse: 67   Resp: 17   Temp: (!) 96 7 °F (35 9 °C)   SpO2:        Laboratory results:    I have personally reviewed all pertinent laboratory/tests results    Most Recent Labs:   Lab Results   Component Value Date    WBC 8 93 07/27/2020    RBC 4 56 07/27/2020    HGB 14 2 07/27/2020    HCT 43 1 07/27/2020     07/27/2020    RDW 14 2 07/27/2020    NEUTROABS 5 57 07/25/2020    SODIUM 136 07/27/2020    K 3 8 07/27/2020     07/27/2020    CO2 27 07/27/2020    BUN 8 07/27/2020    CREATININE 0 82 07/27/2020 GLUC 91 07/27/2020    CALCIUM 8 8 07/27/2020    AST 25 07/25/2020    ALT 24 07/25/2020    ALKPHOS 116 07/25/2020    TP 6 3 (L) 07/25/2020    ALB 3 2 (L) 07/25/2020    TBILI 0 50 07/25/2020    CHOLESTEROL 263 (H) 07/28/2020    HDL 49 07/28/2020    TRIG 200 (H) 07/28/2020    LDLCALC 174 (H) 07/28/2020    [place] 214 07/28/2020    KCJ8KCREOBJZ 5 246 (H) 07/28/2020    RPR Non-Reactive 07/28/2020    HGBA1C 5 0 07/28/2020    EAG 97 07/28/2020       Psychiatric Review of Systems:  Behavior over the last 24 hours:  improved  Sleep: normal  Appetite: normal  Medication side effects: No  ROS: no complaints, all others negative    Mental Status Evaluation:  Appearance:  casually dressed and hospital attire   Behavior:  calm cooperative and pleasant   Speech:  normal pitch and normal volume   Mood:  less depressed and anxious   Affect:  constricted   Language Appropriate   Thought Process:  goal directed   Thought Content:  no overt delusions   Perceptual Disturbances: None   Risk Potential: Suicidal Ideations none, Homicidal Ideations none and Potential for Aggression No   Sensorium:  person, place, time/date and situation   Cognition:  recent and remote memory grossly intact   Consciousness:  alert and awake    Attention: attention span and concentration were age appropriate   Insight:  fair   Judgment: fair   Gait/Station: normal gait/station and normal balance   Motor Activity: no abnormal movements     Progress Toward Goals: progressing    Recommended Treatment: Continue with group therapy, milieu therapy and occupational therapy  1 Continue current meds and treatments  2 plan to discharge in AM     Risks, benefits and possible side effects of Medications:   Risks, benefits, and possible side effects of medications explained to patient and patient verbalizes understanding

## 2020-07-30 NOTE — PLAN OF CARE
Problem: PAIN - ADULT  Goal: Verbalizes/displays adequate comfort level or baseline comfort level  Description  Interventions:  - Encourage patient to monitor pain and request assistance  - Assess pain using appropriate pain scale  - Administer analgesics based on type and severity of pain and evaluate response  - Implement non-pharmacological measures as appropriate and evaluate response  - Consider cultural and social influences on pain and pain management  - Notify physician/advanced practitioner if interventions unsuccessful or patient reports new pain  Outcome: Progressing     Problem: INFECTION - ADULT  Goal: Absence or prevention of progression during hospitalization  Description  INTERVENTIONS:  - Assess and monitor for signs and symptoms of infection  - Monitor lab/diagnostic results  - Monitor all insertion sites, i e  indwelling lines, tubes, and drains  - Monitor endotracheal if appropriate and nasal secretions for changes in amount and color  - [place] appropriate cooling/warming therapies per order  - Administer medications as ordered  - Instruct and encourage patient and family to use good hand hygiene technique  - Identify and instruct in appropriate isolation precautions for identified infection/condition  Outcome: Progressing     Problem: SELF HARM/SUICIDALITY  Goal: Will have no self-injury during hospital stay  Description  INTERVENTIONS:  - Q 15 MINUTES: Routine safety checks  - Q WAKING SHIFT & PRN: Assess risk to determine if routine checks are adequate to maintain patient safety  - Encourage patient to participate actively in care by formulating a plan to combat response to suicidal ideation, identify supports and resources  Outcome: Progressing     Problem: DEPRESSION  Goal: Will be euthymic at discharge  Description  INTERVENTIONS:  - Administer medication as ordered  - Provide emotional support via 1:1 interaction with staff  - Encourage involvement in milieu/groups/activities  - Monitor for social isolation  Outcome: Progressing     Problem: PSYCHOSIS  Goal: Will report no hallucinations or delusions  Description  Interventions:  - Administer medication as  ordered  - Every waking shifts and PRN assess for the presence of hallucinations and or delusions  - Assist with reality testing to support increasing orientation  - Assess if patient's hallucinations or delusions are encouraging self-harm or harm to others and intervene as appropriate  Outcome: Progressing     Problem: ANXIETY  Goal: Will report anxiety at manageable levels  Description  INTERVENTIONS:  - Administer medication as ordered  - Teach and encourage coping skills  - Provide emotional support  - Assess patient/family for anxiety and ability to cope  Outcome: Progressing     Problem: SUBSTANCE USE/ABUSE  Goal: Will have no detox symptoms and will verbalize plan for changing substance-related behavior  Description  INTERVENTIONS:  - Monitor physical status and assess for symptoms of withdrawal  - Administer medication as ordered  - Provide emotional support with 1 on 1 interaction with staff  - Encourage recovery focused program/ addiction education  - Assess for verbalization of changing behaviors related to substance abuse  - Initiate consults and referrals as appropriate (Case Management, Spiritual Care, etc )  Outcome: Progressing

## 2020-07-30 NOTE — PROGRESS NOTES
Pt remains pleasant and social with peers  Denies SI/HI-verbally contracts for safety  Expresses mild anxiety and depression  Feels happy/hopeful she will be discharged tomorrow  Denies any concerns or questions regarding treatment  No inappropriate behavior

## 2020-07-30 NOTE — QUICK NOTE
Patient's chart reviewed  Patient was noted to have hypertension which was labile at [facility] and was started on amlodipine 5 mg p o  B i d   In addition the patient was started on labetalol 200 mg q 12 however this was subsequently increased to 300 Q 8 given persistently elevated blood pressures  Upon arrival to the inpatient psychiatric unit, the patient's blood pressures have once again been labile, sometimes associated with agitation  The patient was noted to developed significant dizziness after use of labetalol and therefore this was discontinued by the psychiatric team   The patient continues on amlodipine 5 mg p o  B i d  Blood pressures have been ranging from 040-864 systolic  Blood pressure this morning is noted to be 145/88  Given dizziness with beta-blocker use and heart rates in the 60s to 70s, would favor avoidance of labetolol/BB in general  Continue norvasc 5 mg PO BID  Will add low dose HCTZ with hold parameters for <130 if BP remains elevated today and monitor response  Her labile BP could be exacerbated by agitation as well  She also complains of right leg IT band pain ongoing as an outpatient  She requests NSAIDS for this  Will also add lidoderm patch and encouraged patient to keep stretching the leg  Stop flomax  She is not on this chronically      Vitals:    07/29/20 1549 07/29/20 1705 07/29/20 1751 07/30/20 0756   BP: 146/87 (!) 166/109 (!) 177/99 145/88   BP Location: Left arm   Right arm   Pulse: 63  82 67   Resp: 18   17   Temp: (!) 96 1 °F (35 6 °C)   (!) 96 7 °F (35 9 °C)   TempSrc: Tympanic   Tympanic   SpO2:       Weight:       Height:         Zoe Albright PA-C

## 2020-07-30 NOTE — CASE MANAGEMENT
CM received another call back from [place] [facility] ([phone])  Pt is unable to do the program virtually as she is living in [place]  Per insurance policy, pt has to be physically present in [place] in order for insurance to pay for the PHP  Pt is now scheduled physically for Wednesday next week start date  Likely dc stays the same for Monday

## 2020-07-30 NOTE — CASE MANAGEMENT
CM received call back from Edna Lees at [facility] ([phone]) program stating that pt can start virtually on either Monday or Tuesday  If pt is discharged on Monday they can start her with the program later in the morning  CM met with pt who reports eagerness for dc  Pt also reports that she is happy to do the program virtually as she has a smart phone and she is good with technology  CM planning for early dc on Monday morning

## 2020-07-30 NOTE — PROGRESS NOTES
Requested received Trazodone 50 mg  Po prn/sleep at 2153  Good effect obtained, as observed asleep in bed within  The hr

## 2020-07-30 NOTE — PROGRESS NOTES
Pt remains pleasant in conversation  Denies SI/HI-verbally contracts for safety  States having some anxiety however does feel this has been more manageable than admission  Improvement with depression  Denies any concerns/questions regarding scheduled medications  Has been attending groups throughout the day and remains social with peers

## 2020-07-30 NOTE — PROGRESS NOTES
07/30/20 0807   Team Meeting   Meeting Type Daily Rounds   Team Members Present   Team Members Present Physician;Nurse;;Occupational Therapist   Physician Team Member Dr Henry Oliveros, [id number]    Nursing Team Member [facility] Management Team Member Symone/ [address]   OT Team Member [facility]   Patient/Family Present   Patient Present No   Patient's Family Present No     Social and attends groups  Trazodone last night to help with sleep  Elevated blood pressure throughout day  This morning 140/88  HR is 67  DC to PHP

## 2020-07-30 NOTE — CASE MANAGEMENT
CM outreached to Geovani Dixon @ [facility] ([phone]) who reports they received the referral  Cm advised they are looking for possible start day of Tuesday next week and one of the staff members will call back with an update on acceptance and start date

## 2020-07-31 VITALS
HEIGHT: 67 IN | WEIGHT: 207.2 LBS | OXYGEN SATURATION: 95 % | BODY MASS INDEX: 32.52 KG/M2 | DIASTOLIC BLOOD PRESSURE: 88 MMHG | TEMPERATURE: 97.7 F | SYSTOLIC BLOOD PRESSURE: 144 MMHG | RESPIRATION RATE: 18 BRPM | HEART RATE: 64 BPM

## 2020-07-31 PROBLEM — T14.91XA SUICIDE ATTEMPT (HCC): Status: RESOLVED | Noted: 2020-07-23 | Resolved: 2020-07-31

## 2020-07-31 PROBLEM — Z00.8 MEDICAL CLEARANCE FOR PSYCHIATRIC ADMISSION: Status: RESOLVED | Noted: 2020-07-28 | Resolved: 2020-07-31

## 2020-07-31 PROCEDURE — 99238 HOSP IP/OBS DSCHRG MGMT 30/<: CPT | Performed by: NURSE PRACTITIONER

## 2020-07-31 RX ORDER — PANTOPRAZOLE SODIUM 40 MG/1
40 TABLET, DELAYED RELEASE ORAL
Qty: 30 TABLET | Refills: 1 | Status: SHIPPED | OUTPATIENT
Start: 2020-08-01

## 2020-07-31 RX ORDER — DULOXETIN HYDROCHLORIDE 60 MG/1
120 CAPSULE, DELAYED RELEASE ORAL DAILY
Qty: 30 CAPSULE | Refills: 1 | Status: SHIPPED | OUTPATIENT
Start: 2020-07-31

## 2020-07-31 RX ORDER — CLONAZEPAM 1 MG/1
1 TABLET ORAL
Qty: 10 TABLET | Refills: 0 | Status: SHIPPED | OUTPATIENT
Start: 2020-07-31 | End: 2020-08-10

## 2020-07-31 RX ORDER — FOLIC ACID 1 MG/1
1 TABLET ORAL DAILY
Qty: 30 TABLET | Refills: 1 | Status: SHIPPED | OUTPATIENT
Start: 2020-07-31

## 2020-07-31 RX ORDER — AMLODIPINE BESYLATE 5 MG/1
5 TABLET ORAL 2 TIMES DAILY
Qty: 60 TABLET | Refills: 1 | Status: SHIPPED | OUTPATIENT
Start: 2020-07-31

## 2020-07-31 RX ORDER — LIDOCAINE 50 MG/G
1 PATCH TOPICAL
Qty: 10 PATCH | Refills: 1 | Status: SHIPPED | OUTPATIENT
Start: 2020-07-31

## 2020-07-31 RX ORDER — LAMOTRIGINE 200 MG/1
200 TABLET ORAL
Qty: 30 TABLET | Refills: 1 | Status: SHIPPED | OUTPATIENT
Start: 2020-07-31

## 2020-07-31 RX ORDER — LANOLIN ALCOHOL/MO/W.PET/CERES
100 CREAM (GRAM) TOPICAL DAILY
Qty: 30 TABLET | Refills: 1 | Status: SHIPPED | OUTPATIENT
Start: 2020-07-31

## 2020-07-31 RX ADMIN — PANTOPRAZOLE SODIUM 40 MG: 40 TABLET, DELAYED RELEASE ORAL at 06:40

## 2020-07-31 RX ADMIN — THIAMINE HCL TAB 100 MG 100 MG: 100 TAB at 08:07

## 2020-07-31 RX ADMIN — DULOXETINE 120 MG: 30 CAPSULE, DELAYED RELEASE ORAL at 08:07

## 2020-07-31 RX ADMIN — AMLODIPINE BESYLATE 5 MG: 5 TABLET ORAL at 08:07

## 2020-07-31 RX ADMIN — Medication 1 TABLET: at 08:07

## 2020-07-31 RX ADMIN — FOLIC ACID 1 MG: 1 TABLET ORAL at 08:07

## 2020-07-31 NOTE — CASE MANAGEMENT
CM outreached to Caitlin Ga @ [facility] who reports that pt is able to do the program if she is at a friends house in [place]  CM met with pt who reports she can go to her mother in laws house [place]  CM outreached to pt's  who is supportive of dc for tomorrow and plan for pt to do PHP at mother in law;s house  Caitlin Koroma confirmed intake appointment today with start date of Monday  Pre-cert will need to be done today for start date of Monday  Caitlin Koroma will outreach to pt to discuss start times on Monday  CM met with pt who is denying SI  Pt is motivated for dc today and is ready to start PHP on Monday  Pt reports improvement in symptoms and has attended and participated in groups  Pt provided with Covid 19 information and crisis numbers
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
## 2020-07-31 NOTE — PROGRESS NOTES
Pt   Requested   And  Received  Atarax   50  Mg  Po  For  Anxiety/sleep at  2204  A/S-22  Med  Effective  Pt  Appears  To be  Sleeping

## 2020-07-31 NOTE — DISCHARGE INSTR - OTHER ORDERS
You were provided with the following information, resources, and services throughout [place]: The Crisis Intervention Unit/Emergency Services (CIU/ES) program provides emergency mental health services to [place] residents who are experiencing a mental health crisis, their families and caregivers  The primary goals of the CIU/ES program are to prevent suicide, homicide and other violent and self-destructive behaviors or dangerous situations from occurring as the result of an individuals untreated mental illness or as a result of a family experiencing a mental health crisis  CIU/ES program provides a range of crisis responses that are available 24 hours/day, 365 days/year and that are provided by specially-trained professional staff who are also certified by the [address] [place] [address])  Crisis services include:    A 24-hour crisis telephone hotline staffed by professionals ([phone])   24-hour mobile outreach capability to consumers (adults and children) throughout [place] to provide on-site assessment   Psychiatric/Behavioral health Crisis management   Assessment for psychiatric medication   Assessment for Psychiatric Hospitalization   Linkage with other community services for both the consumer and family members/caregivers   Linkage with follow-up mental health, health and     [facility] BEHAVIORAL HEALTH Crisis Screening/24-hour Crisis Hotline is available 24 hours per day, 7 days per week to provide immediate response to [place] residents via the 24-hour Crisis Hotline and round-the-clock mobile outreach into the community to offer assessment, crisis stabilization and early intervention during a mental health crisis      24-HOUR CRISIS HOT LINE: [phone]

## 2020-07-31 NOTE — PROGRESS NOTES
07/31/20 0757   Team Meeting   Meeting Type Daily Rounds   Team Members Present   Team Members Present Physician;Nurse;;Occupational Therapist   Physician Team Member Dr Madhav Dominguez, Isabel Balbuena ([address] )   Nursing Team Member [facility] Management Team Member Symone/ [address]   OT Team Member [facility]   Patient/Family Present   Patient Present No   Patient's Family Present No     DC for today  Plan to go to [facility]  No SI  Pt will go home with  today at 5

## 2020-07-31 NOTE — NURSING NOTE
Pt remains pleasant this morning, social with peers on the unit  Denies SI/HI-verbally contracts for safety  Mild anxiety and depression  Pt expresses being happy with her prescribed medications and denies any concerns  Slept well overnight  Reviewed AVS discharge instructions with patient  Patient denies any concerns or questions  Expresses readiness for discharge   arrived to pick patient up and pt was discharged from the unit pleasant and calm

## 2020-07-31 NOTE — BH TRANSITION RECORD
Contact Information: If you have any questions, concerns, pended studies, tests and/or procedures, or emergencies regarding your inpatient behavioral health visit  Please contact [facility] behavioral health unit [phone] and ask to speak to a , nurse or physician  A contact is available 24 hours/ 7 days a week at this number  Summary of Procedures Performed During your Stay:  Below is a list of major procedures performed during your hospital stay and a summary of results:  - Cardiac Procedures/Studies: EKG  Pending Studies (From admission, onward)    None        If studies are pending at discharge, follow up with your PCP and/or referring provider

## 2020-07-31 NOTE — QUICK NOTE
Blood pressure noted to be 140s with the highs for approximately 160 throughout the day yesterday  Will continue on amlodipine 5 mg p o  B i d   Would not at hydrochlorothiazide at this time  Would not resume labetalol at this time  Continue to monitor blood pressure closely and if consistently elevated, outside of any times of agitation or significant anxiety, would then add low-dose hydrochlorothiazide or ACE-inhibitor

## 2020-08-22 DIAGNOSIS — F31.9 BIPOLAR DEPRESSION (HCC): ICD-10-CM

## 2020-08-25 RX ORDER — LAMOTRIGINE 200 MG/1
TABLET ORAL
Qty: 30 TABLET | Refills: 1 | OUTPATIENT
Start: 2020-08-25

## 2021-04-05 ENCOUNTER — IMMUNIZATIONS (OUTPATIENT)
Dept: FAMILY MEDICINE CLINIC | Facility: HOSPITAL | Age: 56
End: 2021-04-05

## 2021-04-05 DIAGNOSIS — Z23 ENCOUNTER FOR IMMUNIZATION: Primary | ICD-10-CM

## 2021-04-05 PROCEDURE — 91300 SARS-COV-2 / COVID-19 MRNA VACCINE (PFIZER-BIONTECH) 30 MCG: CPT

## 2021-04-05 PROCEDURE — 0002A SARS-COV-2 / COVID-19 MRNA VACCINE (PFIZER-BIONTECH) 30 MCG: CPT

## 2021-05-04 ENCOUNTER — IMMUNIZATIONS (OUTPATIENT)
Dept: FAMILY MEDICINE CLINIC | Facility: HOSPITAL | Age: 56
End: 2021-05-04

## 2021-05-04 DIAGNOSIS — Z23 ENCOUNTER FOR IMMUNIZATION: Primary | ICD-10-CM

## 2021-05-04 PROCEDURE — 0002A SARS-COV-2 / COVID-19 MRNA VACCINE (PFIZER-BIONTECH) 30 MCG: CPT

## 2021-05-04 PROCEDURE — 91300 SARS-COV-2 / COVID-19 MRNA VACCINE (PFIZER-BIONTECH) 30 MCG: CPT

## 2022-01-30 ENCOUNTER — IMMUNIZATIONS (OUTPATIENT)
Dept: FAMILY MEDICINE CLINIC | Facility: HOSPITAL | Age: 57
End: 2022-01-30

## 2022-05-27 NOTE — UTILIZATION REVIEW
Notification of Discharge  This is a Notification of Discharge from our facility [address]  Please be advised that this patient has been discharge from our facility  Below you will find the admission and discharge date and time including the patients disposition  PRESENTATION DATE: 7/22/2020  9:50 PM  OBS ADMISSION DATE:   IP ADMISSION DATE: 7/22/20 2358   DISCHARGE DATE: 7/27/2020  7:55 PM  DISPOSITION: [address]   Admission Orders listed below:  Admission Orders (From admission, onward)     Ordered        07/22/20 2358  Inpatient Admission  Once                   Please contact the UR Department if additional information is required to close this patient's authorization/case  [facility] Utilization Review Department  Main: [phone] x carefully listen to the prompts  All voicemails are confidential   [email or web address]  Send all requests for admission clinical reviews, approved or denied determinations and any other requests to dedicated fax number below belonging to the campus where the patient is receiving treatment   List of dedicated fax numbers:  [address] DENIALS (Administrative/Medical Necessity) [phone]   [address] (Maternity/NICU/Pediatrics) [phone]   Homero Remy [phone]   Edmond Pereira [phone]   Traci [facility] [phone]   rKistina Umanzor[address] [phone]   [facility]  [phone]   [address] [facility]  [address] [facility] [address] [facility] [phone] PATIENT IS REQUESTING THE ANTIBIOTIC FOR HER HIDRADENITIS SUPPURATIVA. PATIENT STATES THAT HER DERMATOLOGIST WON'T GIVE HER DOXYCYCLINE BECAUSE THE PATIENT HAS A LOW GRADE TEMPERATURE OF 99.2 AND THEY TOLD HER THE TEMP IS NOT A SIDE EFFECT FROM THIS AND THEY WANT HER TESTED FOR FLU AND COVID, BUT THE PATIENT STATES SHE DOESN'T HAVE ANY OTHER SYMPTOMS AND ISN'T SICK.       PATIENT IS ALSO REQUESTING A NEW REFERRAL TO A DIFFERENT DERMATOLOGIST. I HAVE PENDED THE ORDER.

## 2025-02-04 ENCOUNTER — APPOINTMENT (OUTPATIENT)
Dept: LAB | Facility: HOSPITAL | Age: 60
End: 2025-02-04

## 2025-02-04 DIAGNOSIS — Z11.1 TUBERCULOSIS SCREENING: ICD-10-CM

## 2025-02-04 DIAGNOSIS — Z01.84 IMMUNITY STATUS TESTING: ICD-10-CM

## 2025-02-04 LAB — RUBV IGG SERPL IA-ACNC: 83 IU/ML

## 2025-02-04 PROCEDURE — 86480 TB TEST CELL IMMUN MEASURE: CPT

## 2025-02-04 PROCEDURE — 36415 COLL VENOUS BLD VENIPUNCTURE: CPT

## 2025-02-04 PROCEDURE — 86787 VARICELLA-ZOSTER ANTIBODY: CPT

## 2025-02-04 PROCEDURE — 86762 RUBELLA ANTIBODY: CPT

## 2025-02-04 PROCEDURE — 86735 MUMPS ANTIBODY: CPT

## 2025-02-04 PROCEDURE — 86765 RUBEOLA ANTIBODY: CPT

## 2025-02-05 LAB
GAMMA INTERFERON BACKGROUND BLD IA-ACNC: 0.04 IU/ML
M TB IFN-G BLD-IMP: NEGATIVE
M TB IFN-G CD4+ BCKGRND COR BLD-ACNC: -0.02 IU/ML
M TB IFN-G CD4+ BCKGRND COR BLD-ACNC: 0 IU/ML
MEV IGG SER QL IA: ABNORMAL
MITOGEN IGNF BCKGRD COR BLD-ACNC: 9.96 IU/ML
MUV IGG SER QL IA: NORMAL
VZV IGG SER QL IA: NORMAL